# Patient Record
Sex: FEMALE | Race: WHITE | NOT HISPANIC OR LATINO | Employment: OTHER | URBAN - METROPOLITAN AREA
[De-identification: names, ages, dates, MRNs, and addresses within clinical notes are randomized per-mention and may not be internally consistent; named-entity substitution may affect disease eponyms.]

---

## 2019-06-06 ENCOUNTER — APPOINTMENT (OUTPATIENT)
Dept: WOMENS IMAGING | Facility: HOSPITAL | Age: 54
End: 2019-06-06

## 2019-06-06 PROCEDURE — 77067 SCR MAMMO BI INCL CAD: CPT | Performed by: RADIOLOGY

## 2021-06-16 ENCOUNTER — OFFICE VISIT (OUTPATIENT)
Dept: OBSTETRICS AND GYNECOLOGY | Age: 56
End: 2021-06-16

## 2021-06-16 VITALS
BODY MASS INDEX: 33.13 KG/M2 | SYSTOLIC BLOOD PRESSURE: 140 MMHG | HEIGHT: 62 IN | WEIGHT: 180 LBS | DIASTOLIC BLOOD PRESSURE: 86 MMHG

## 2021-06-16 DIAGNOSIS — Z12.31 SCREENING MAMMOGRAM, ENCOUNTER FOR: ICD-10-CM

## 2021-06-16 DIAGNOSIS — Z01.419 WELL WOMAN EXAM WITH ROUTINE GYNECOLOGICAL EXAM: Primary | ICD-10-CM

## 2021-06-16 PROBLEM — K21.9 GASTROESOPHAGEAL REFLUX DISEASE: Status: ACTIVE | Noted: 2021-06-16

## 2021-06-16 PROCEDURE — 99386 PREV VISIT NEW AGE 40-64: CPT | Performed by: NURSE PRACTITIONER

## 2021-06-16 RX ORDER — AMLODIPINE BESYLATE 5 MG/1
5 TABLET ORAL DAILY
COMMUNITY
Start: 2021-04-22 | End: 2022-09-27

## 2021-06-16 RX ORDER — SERTRALINE HYDROCHLORIDE 100 MG/1
100 TABLET, FILM COATED ORAL DAILY
COMMUNITY
Start: 2021-05-03 | End: 2022-06-24

## 2021-06-16 RX ORDER — MELOXICAM 15 MG/1
15 TABLET ORAL DAILY
COMMUNITY
Start: 2021-04-22 | End: 2022-10-12

## 2021-06-16 RX ORDER — LOSARTAN POTASSIUM 100 MG/1
100 TABLET ORAL DAILY
COMMUNITY
Start: 2021-04-22 | End: 2022-10-12

## 2021-06-16 RX ORDER — HYDROXYZINE HYDROCHLORIDE 25 MG/1
25 TABLET, FILM COATED ORAL EVERY 8 HOURS PRN
COMMUNITY
Start: 2021-05-03 | End: 2022-05-31 | Stop reason: SDUPTHER

## 2021-06-16 RX ORDER — METOPROLOL TARTRATE 50 MG/1
TABLET, FILM COATED ORAL
COMMUNITY
Start: 2021-04-22 | End: 2022-04-09

## 2021-06-16 NOTE — PROGRESS NOTES
[facility]  Routine Annual Visit    2021    Patient: Rajani Valentine          MR#:[id number]      History of Present Illness    55 y.o. female No obstetric history on file. who presents for annual exam as a new patient    Reports last GYN care/mammogram about 2 years ago  Former patient of Dr. Hager  Reports hx abnormal pap years ago followed by negatives  Reports yearly mammograms through [facility]- calling for report  She denies any significant GYN history other than 3   Reports menopause at age 50    She states for the past 3 months she has experienced a vaginal irritation that comes and goes right side vaginal wall that occasionally bleeds.  Reports no new partners or concern for STI. In a relationship x 17 years.  She also reports intermittent pelvic pain x 3 months as well.    Patient's last menstrual period was 10/16/2015.  Obstetric History:  OB History    No obstetric history on file.        Menstrual History:     Patient's last menstrual period was 10/16/2015.       Sexual History:       ________________________________________  Patient Active Problem List   Diagnosis   • Gastroesophageal reflux disease       Past Medical History:   Diagnosis Date   • Anxiety    • Depression    • Irritable bowel syndrome        Past Surgical History:   Procedure Laterality Date   • ANKLE ARTHROPLASTY Left    • ANKLE SURGERY     • CHOLECYSTECTOMY     • CHOLECYSTECTOMY Bilateral    • ENDOMETRIAL ABLATION     • TUBAL ABDOMINAL LIGATION         Social History     Tobacco Use   Smoking Status Light Tobacco Smoker   Smokeless Tobacco Never Used   Tobacco Comment    quit 2 years ago       Family History   Problem Relation Age of Onset   • Skin cancer Father    • Heart disease Father    • Hypertension Mother    • Hypertension Brother    • No Known Problems Sister    • No Known Problems Paternal Grandfather    • Heart disease Paternal Grandmother    • Colon cancer Maternal  Grandmother    • Dementia Maternal Grandfather    • No Known Problems Maternal Aunt    • No Known Problems Maternal Uncle    • No Known Problems Paternal Aunt    • Skin cancer Paternal Uncle        Prior to Admission medications    Medication Sig Start Date End Date Taking? Authorizing Provider   amLODIPine (NORVASC) 5 MG tablet  4/22/21  Yes Malena Rea MD   hydrOXYzine (ATARAX) 25 MG tablet  5/3/21  Yes Malena Rea MD   losartan (COZAAR) 100 MG tablet  4/22/21  Yes Malena Rea MD   meloxicam (MOBIC) 15 MG tablet  4/22/21  Yes ProviderMalena MD   metoprolol tartrate (LOPRESSOR) 50 MG tablet  4/22/21  Yes Malena Rea MD   ondansetron ODT (ZOFRAN-ODT) 8 MG disintegrating tablet Take 1 tablet by mouth every 8 (eight) hours as needed for nausea or vomiting. 8/18/16  Yes Monroe Sharma MD   sertraline (ZOLOFT) 100 MG tablet  5/3/21  Yes Malena Rea MD     ________________________________________    The following portions of the patient's history were reviewed and updated as appropriate: allergies, current medications, past family history, past medical history, past social history, past surgical history and problem list.    Review of Systems   Constitutional: Negative.    HENT: Negative.    Eyes: Negative for visual disturbance.   Respiratory: Negative for cough, shortness of breath and wheezing.    Cardiovascular: Negative for chest pain, palpitations and leg swelling.   Gastrointestinal: Negative for abdominal distention, abdominal pain, blood in stool, constipation, diarrhea, nausea and vomiting.   Endocrine: Negative for cold intolerance and heat intolerance.   Genitourinary: Positive for vaginal pain. Negative for difficulty urinating, dyspareunia, dysuria, frequency, genital sores, hematuria, menstrual problem, pelvic pain, urgency, vaginal bleeding and vaginal discharge.   Musculoskeletal: Negative.    Skin: Negative.    Neurological: Negative for  "dizziness, weakness, light-headedness, numbness and headaches.   Hematological: Negative.    Psychiatric/Behavioral: Negative.    Breasts: negative for lumps skin changes, dimpling, swelling, nipple changes/discharge bilaterally       Objective   Physical Exam    /86   Ht 157.5 cm (62\")   Wt 81.6 kg (180 lb)   LMP 10/16/2015   Breastfeeding No   BMI 32.92 kg/m²    BP Readings from Last 3 Encounters:   06/16/21 140/86   08/18/16 147/100      Wt Readings from Last 3 Encounters:   06/16/21 81.6 kg (180 lb)   08/18/16 74.8 kg (165 lb)        BMI: Estimated body mass index is 32.92 kg/m² as calculated from the following:    Height as of this encounter: 157.5 cm (62\").    Weight as of this encounter: 81.6 kg (180 lb).            General:   alert, appears stated age and cooperative   Heart: regular rate and rhythm, S1, S2 normal, no murmur, click, rub or gallop   Lungs: clear to auscultation bilaterally   Abdomen: soft, non-tender, without masses or organomegaly   Breast: inspection negative, no nipple discharge or bleeding, no masses or nodularity palpable   Vulva: External genitalia including bartholin's glands, Urethra, [place]'s gland and urethra meatus are normal, Perineum, rectum and anus appear normal  and Bladder appears normal without significant prolapse    Vagina: normal mucosa, normal discharge   Cervix: no cervical motion tenderness and no lesions   Uterus: normal size, mobile, non-tender and normal shape and consistency   Adnexa: no mass, fullness, tenderness     Assessment:    Diagnoses and all orders for this visit:    1. Well woman exam with routine gynecological exam (Primary)  -     IgP, Aptima HPV    2. Screening mammogram, encounter for  -     Mammo Screening Digital Tomosynthesis Bilateral With CAD; Future        Healthy lifestyle modifications discussed, counseled on self breast exams and bone health    No acute findings on exam today. Return for T/V US and vulvar check Dr. Rodrigo ALEXANDER " SKY Lynn  6/16/2021 13:31 EDT

## 2021-06-18 ENCOUNTER — TELEPHONE (OUTPATIENT)
Dept: OBSTETRICS AND GYNECOLOGY | Age: 56
End: 2021-06-18

## 2021-06-18 NOTE — TELEPHONE ENCOUNTER
PT CALLED STATES SHE IS HAVING THE VAGINAL BURNING AGAIN THAT SHE DISCUSSED WITH YOU ON WED. 6/16/21 WHEN SHE WAS IN THE OFFICE. PT ASKING FOR YOU TO CALL HER BACK AND TO POSSIBLY CALL A MEDICATION INTO HER PHARMACY. ADVISED PT YOU WOULD NOT BE BACK IN OFFICE UNTIL MON. 6/21/21. PT SAID SHE UNDERSTOOD AND WOULD WAIT TO HEAR FROM US THEN. PT PHONE NUMBER [phone]. PLEASE ADVISE.

## 2021-06-21 NOTE — TELEPHONE ENCOUNTER
No abnormalities were seen on exam last week. Recommend keep upcoming appt with Dr. Wallace to further assess.

## 2021-06-22 PROBLEM — B97.7 HPV IN FEMALE: Status: ACTIVE | Noted: 2021-06-22

## 2021-06-22 PROBLEM — R87.622 LGSIL PAP SMEAR OF VAGINA: Status: ACTIVE | Noted: 2021-06-22

## 2021-06-22 LAB
CYTOLOGIST CVX/VAG CYTO: ABNORMAL
CYTOLOGY CVX/VAG DOC CYTO: ABNORMAL
CYTOLOGY CVX/VAG DOC THIN PREP: ABNORMAL
DX ICD CODE: ABNORMAL
DX ICD CODE: ABNORMAL
HIV 1 & 2 AB SER-IMP: ABNORMAL
HPV I/H RISK 4 DNA CVX QL PROBE+SIG AMP: POSITIVE
OTHER STN SPEC: ABNORMAL
PATHOLOGIST CVX/VAG CYTO: ABNORMAL
RECOM F/U CVX/VAG CYTO: ABNORMAL
STAT OF ADQ CVX/VAG CYTO-IMP: ABNORMAL

## 2021-07-14 ENCOUNTER — OFFICE VISIT (OUTPATIENT)
Dept: OBSTETRICS AND GYNECOLOGY | Age: 56
End: 2021-07-14

## 2021-07-14 VITALS
WEIGHT: 176 LBS | HEIGHT: 62 IN | BODY MASS INDEX: 32.39 KG/M2 | SYSTOLIC BLOOD PRESSURE: 124 MMHG | DIASTOLIC BLOOD PRESSURE: 80 MMHG

## 2021-07-14 DIAGNOSIS — B97.7 HPV IN FEMALE: ICD-10-CM

## 2021-07-14 DIAGNOSIS — N90.5 VULVAR ATROPHY: ICD-10-CM

## 2021-07-14 DIAGNOSIS — I10 ESSENTIAL HYPERTENSION: ICD-10-CM

## 2021-07-14 DIAGNOSIS — B37.31 CANDIDAL VULVOVAGINITIS: Primary | ICD-10-CM

## 2021-07-14 DIAGNOSIS — R87.612 LGSIL ON PAP SMEAR OF CERVIX: ICD-10-CM

## 2021-07-14 PROBLEM — R87.622 LGSIL PAP SMEAR OF VAGINA: Status: RESOLVED | Noted: 2021-06-22 | Resolved: 2021-07-14

## 2021-07-14 PROCEDURE — 99214 OFFICE O/P EST MOD 30 MIN: CPT | Performed by: OBSTETRICS & GYNECOLOGY

## 2021-07-14 RX ORDER — CONJUGATED ESTROGENS 0.62 MG/G
CREAM VAGINAL DAILY
Qty: 30 G | Refills: 6 | Status: SHIPPED | OUTPATIENT
Start: 2021-07-14 | End: 2021-08-11

## 2021-07-14 NOTE — PROGRESS NOTES
"    2021      Patient:  Rajani Valentine   MR#:[id number]    Office note    Chief Complaint   Patient presents with   • Gynecologic Exam     cc: vaginal irritation that comes and goes , describes like having a \" sunburn there \",  occasionally bleeds - spoting , itching , pt c/o having diarrhea - due for colonoscopy - family hx of colon cx ,  last menstrual period was 10/16/2015- ablation , tubal for contraception , last pap 2021 lsil hpv pos , pt reports having one abn pap maybe 20 yrs ago        Subjective     History of Present Illness  55 y.o. female  presents with complaint of intermittent moderate chronic vulvar irritation and burning for more than a year.  Seems to be exacerbated by any type of contact and her symptoms appear to be worsening over the past 6 months.    Additionally the patient had LGSIL on Pap smear and is scheduled for colposcopy in September.  We reviewed LGSIL and discussed appropriate therapy and management.        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 cervix       Past Medical History:   Diagnosis Date   • Anxiety    • Depression    • Irritable bowel syndrome      Past Surgical History:   Procedure Laterality Date   • ANKLE ARTHROPLASTY Left    • ANKLE SURGERY     • CHOLECYSTECTOMY     • CHOLECYSTECTOMY Bilateral    • ENDOMETRIAL ABLATION     • TUBAL ABDOMINAL LIGATION       Obstetric History:  OB History        3    Para   3    Term   3            AB        Living   3       SAB        TAB        Ectopic        Molar        Multiple        Live Births   3               Menstrual History:     Patient's last menstrual period was 10/16/2015 (exact date).       # 1 - Date: , Sex: Female, Weight: None, GA: None, Delivery: Vaginal, Spontaneous, Apgar1: None, Apgar5: None, " Living: Living, Birth Comments: None    # 2 - Date: 1989, Sex: Female, Weight: None, GA: None, Delivery: Vaginal, Spontaneous, Apgar1: None, Apgar5: None, Living: Living, Birth Comments: None    # 3 - Date: 1992, Sex: Female, Weight: None, GA: None, Delivery: Vaginal, Spontaneous, Apgar1: None, Apgar5: None, Living: Living, Birth Comments: None    Family History   Problem Relation Age of Onset   • Skin cancer Father    • Heart disease Father    • Hypertension Mother    • Hypertension Brother    • No Known Problems Sister    • No Known Problems Paternal Grandfather    • Heart disease Paternal Grandmother    • Colon cancer Maternal Grandmother    • Dementia Maternal Grandfather    • No Known Problems Maternal Aunt    • No Known Problems Maternal Uncle    • No Known Problems Paternal Aunt    • Skin cancer Paternal Uncle      Social History     Tobacco Use   • Smoking status: Light Tobacco Smoker   • Smokeless tobacco: Never Used   • Tobacco comment: quit 2 years ago   Substance Use Topics   • Alcohol use: Yes     Comment: socially   • Drug use: No     Gatifloxacin    Current Outpatient Medications:   •  amLODIPine (NORVASC) 5 MG tablet, , Disp: , Rfl:   •  hydrOXYzine (ATARAX) 25 MG tablet, , Disp: , Rfl:   •  losartan (COZAAR) 100 MG tablet, , Disp: , Rfl:   •  meloxicam (MOBIC) 15 MG tablet, , Disp: , Rfl:   •  metoprolol tartrate (LOPRESSOR) 50 MG tablet, , Disp: , Rfl:   •  ondansetron ODT (ZOFRAN-ODT) 8 MG disintegrating tablet, Take 1 tablet by mouth every 8 (eight) hours as needed for nausea or vomiting., Disp: 10 tablet, Rfl: 0  •  sertraline (ZOLOFT) 100 MG tablet, , Disp: , Rfl:     The following portions of the patient's history were reviewed and updated as appropriate: allergies, current medications, past family history, past medical history, past social history, past surgical history and problem list.    Review of Systems   Constitutional: Negative.    Respiratory: Negative.    Cardiovascular: Negative.   "  Gastrointestinal: Negative.    Genitourinary: Negative.         Chronic vulvar pain and irritation.   Psychiatric/Behavioral: Negative.        BP Readings from Last 3 Encounters:   07/14/21 124/80   06/16/21 140/86   08/18/16 147/100      Wt Readings from Last 3 Encounters:   07/14/21 79.8 kg (176 lb)   06/16/21 81.6 kg (180 lb)   08/18/16 74.8 kg (165 lb)      BMI: Estimated body mass index is 32.19 kg/m² as calculated from the following:    Height as of this encounter: 157.5 cm (62\").    Weight as of this encounter: 79.8 kg (176 lb). BSA: Estimated body surface area is 1.81 meters squared as calculated from the following:    Height as of this encounter: 157.5 cm (62\").    Weight as of this encounter: 79.8 kg (176 lb).    Objective   Physical Exam    Assessment/Plan     Diagnoses and all orders for this visit:    1. Candidal vulvovaginitis (Primary)    2. Vulvar atrophy  -     conjugated estrogens (Premarin) 0.625 MG/GM vaginal cream; Insert  into the vagina Daily for 28 days. Apply 1/2 to 1 gram at bedtime as instructed  Dispense: 30 g; Refill: 6    3. LGSIL on Pap smear of cervix    4. HPV in female    5. Essential hypertension      Plan:  Genitan Madalyn applied for cutaneous yeast  Discussed sleeping without underpants at night and keeping the area dry and cool  Premarin cream is prescribed  Discussed expectations for improvement  May return for repeat Genitan Violet applications if burning symptoms or not improved      No follow-ups on file.    Dereje Wallace MD   7/14/2021 14:13 EDT  "

## 2021-09-01 ENCOUNTER — OFFICE VISIT (OUTPATIENT)
Dept: OBSTETRICS AND GYNECOLOGY | Age: 56
End: 2021-09-01

## 2021-09-01 VITALS
DIASTOLIC BLOOD PRESSURE: 88 MMHG | SYSTOLIC BLOOD PRESSURE: 140 MMHG | WEIGHT: 169 LBS | BODY MASS INDEX: 31.1 KG/M2 | HEIGHT: 62 IN

## 2021-09-01 DIAGNOSIS — R87.612 LGSIL ON PAP SMEAR OF CERVIX: Primary | ICD-10-CM

## 2021-09-01 DIAGNOSIS — Z13.89 SCREENING FOR BLOOD OR PROTEIN IN URINE: ICD-10-CM

## 2021-09-01 LAB
BILIRUB BLD-MCNC: NEGATIVE MG/DL
CLARITY, POC: CLEAR
COLOR UR: YELLOW
GLUCOSE UR STRIP-MCNC: NEGATIVE MG/DL
KETONES UR QL: NEGATIVE
LEUKOCYTE EST, POC: ABNORMAL
NITRITE UR-MCNC: NEGATIVE MG/ML
PH UR: 5 [PH] (ref 5–8)
PROT UR STRIP-MCNC: NEGATIVE MG/DL
RBC # UR STRIP: ABNORMAL /UL
SP GR UR: 1.03 (ref 1–1.03)
UROBILINOGEN UR QL: NORMAL

## 2021-09-01 PROCEDURE — 57452 EXAM OF CERVIX W/SCOPE: CPT | Performed by: OBSTETRICS & GYNECOLOGY

## 2021-09-01 RX ORDER — TRAZODONE HYDROCHLORIDE 50 MG/1
50 TABLET ORAL NIGHTLY
COMMUNITY
End: 2022-04-09

## 2021-09-01 RX ORDER — HYDROCHLOROTHIAZIDE 12.5 MG/1
12.5 CAPSULE, GELATIN COATED ORAL DAILY
COMMUNITY
End: 2022-06-24

## 2021-09-01 RX ORDER — OMEPRAZOLE 20 MG/1
20 CAPSULE, DELAYED RELEASE ORAL DAILY
COMMUNITY

## 2021-09-01 NOTE — PROGRESS NOTES
9/1/2021    Patient: Rajani Valentine          MR#:[id number]        Colposcopy Procedure Note      Chief Complaint   Patient presents with   • Follow-up     colpo last pap 6/2021 LSIL HPV+       Indications: Pap smear 3 months ago showed: low-grade squamous intraepithelial neoplasia (LGSIL - encompassing HPV,mild dysplasia,PARAG I).   IgP, Aptima HPV (06/16/2021 14:45)        Procedure Details   The risks and benefits of the procedure and Written informed consent obtained.    Speculum placed in vagina and excellent visualization of cervix achieved, cervix swabbed x 3 with acetic acid solution.    Findings:  Cervix: no visible lesions; SCJ visualized 360 degrees without lesions.  Vaginal inspection: vaginal colposcopy not performed.  Vulvar colposcopy: vulvar colposcopy not performed.    Physical Exam    Specimens: PAP      Impression:  Normal exam, no lesions      Complications: none.    The procedure was well tolerated by the patient without problems.    Plan:  Will base further treatment on Pathology findings.          Dereje Wallace MD  9/1/2021  09:59 EDT

## 2021-09-07 LAB
CYTOLOGIST CVX/VAG CYTO: NORMAL
CYTOLOGY CVX/VAG DOC CYTO: NORMAL
CYTOLOGY CVX/VAG DOC THIN PREP: NORMAL
DX ICD CODE: NORMAL
HIV 1 & 2 AB SER-IMP: NORMAL
HPV I/H RISK 4 DNA CVX QL PROBE+SIG AMP: NEGATIVE
OTHER STN SPEC: NORMAL
PATHOLOGIST CVX/VAG CYTO: NORMAL
STAT OF ADQ CVX/VAG CYTO-IMP: NORMAL

## 2021-09-28 ENCOUNTER — APPOINTMENT (OUTPATIENT)
Dept: WOMENS IMAGING | Facility: HOSPITAL | Age: 56
End: 2021-09-28

## 2021-09-28 PROCEDURE — 77067 SCR MAMMO BI INCL CAD: CPT | Performed by: RADIOLOGY

## 2022-04-09 RX ORDER — TRAZODONE HYDROCHLORIDE 50 MG/1
TABLET ORAL
Qty: 90 TABLET | Refills: 1 | Status: SHIPPED | OUTPATIENT
Start: 2022-04-09 | End: 2022-12-01

## 2022-04-09 RX ORDER — METOPROLOL TARTRATE 50 MG/1
TABLET, FILM COATED ORAL
Qty: 180 TABLET | Refills: 1 | Status: SHIPPED | OUTPATIENT
Start: 2022-04-09 | End: 2022-05-31

## 2022-04-18 ENCOUNTER — OFFICE VISIT (OUTPATIENT)
Dept: FAMILY MEDICINE CLINIC | Facility: CLINIC | Age: 57
End: 2022-04-18

## 2022-04-18 VITALS
HEART RATE: 74 BPM | OXYGEN SATURATION: 98 % | HEIGHT: 63 IN | TEMPERATURE: 97.7 F | WEIGHT: 167.1 LBS | SYSTOLIC BLOOD PRESSURE: 160 MMHG | DIASTOLIC BLOOD PRESSURE: 98 MMHG | BODY MASS INDEX: 29.61 KG/M2

## 2022-04-18 DIAGNOSIS — Z00.00 ENCOUNTER FOR WELLNESS EXAMINATION IN ADULT: Primary | ICD-10-CM

## 2022-04-18 DIAGNOSIS — Z12.11 SCREENING FOR COLORECTAL CANCER: ICD-10-CM

## 2022-04-18 DIAGNOSIS — I10 PRIMARY HYPERTENSION: ICD-10-CM

## 2022-04-18 DIAGNOSIS — K59.00 CONSTIPATION, UNSPECIFIED CONSTIPATION TYPE: ICD-10-CM

## 2022-04-18 DIAGNOSIS — Z12.12 SCREENING FOR COLORECTAL CANCER: ICD-10-CM

## 2022-04-18 PROCEDURE — 99396 PREV VISIT EST AGE 40-64: CPT | Performed by: FAMILY MEDICINE

## 2022-04-18 NOTE — ASSESSMENT & PLAN NOTE
Patient just recently restarted her metoprolol.  She will give this another week and if still elevated she will increase her hydrochlorothiazide to 25 mg daily.  Follow-up in 6 weeks with blood pressure log.

## 2022-04-18 NOTE — PROGRESS NOTES
Patient Name: Rajani Valentine  : 1965   MRN: [id number]     Chief Complaint:    Chief Complaint   Patient presents with   • Hypertension     Follow up on blood pressure        History of Present Illness: Rajani Valentine is a 56 y.o. female who is here today for their annual health maintenance and physical. Patient presents for follow-up on chronic medical problems including hypertension, depression and GERD. Patient denies adverse effects of medications, headache, chest pain, shortness of breath and cough. Patient complains of elevated blood pressure recently.  She was out of her metoprolol for a few weeks and recently just restarted about 1 week ago.  She has noticed that her blood pressures been up because she is been hearing her heartbeat in her ears.  Has been checking intermittently at home and reports they are consistent with today's in office blood pressures.  She is up-to-date on pap and mammogram.  She is due for colon screening.             Review of Systems:   Review of Systems   Constitutional: Negative for chills, fatigue and fever.   Respiratory: Negative for cough and shortness of breath.    Cardiovascular: Negative for chest pain and palpitations.   Gastrointestinal: Negative for abdominal pain, constipation, diarrhea, nausea and vomiting.   Musculoskeletal: Negative for back pain and myalgias.   Neurological: Negative for dizziness and headache.   Psychiatric/Behavioral: Negative for depressed mood. The patient is not nervous/anxious.        Past Medical History, Social History, Family History and Care Team were all reviewed with patient and updated as appropriate.     Medications:     Current Outpatient Medications:   •  amLODIPine (NORVASC) 5 MG tablet, , Disp: , Rfl:   •  hydroCHLOROthiazide (MICROZIDE) 12.5 MG capsule, Take 12.5 mg by mouth Daily., Disp: , Rfl:   •  hydrOXYzine (ATARAX) 25 MG tablet, Take 25 mg by mouth Every 8 (Eight) Hours As Needed for Anxiety., Disp: , Rfl:   •   losartan (COZAAR) 100 MG tablet, Take 100 mg by mouth Daily., Disp: , Rfl:   •  meloxicam (MOBIC) 15 MG tablet, Take 15 mg by mouth Daily., Disp: , Rfl:   •  metoprolol tartrate (LOPRESSOR) 50 MG tablet, TAKE ONE TABLET BY MOUTH TWICE A DAY *TAKE WITH MEALS*, Disp: 180 tablet, Rfl: 1  •  omeprazole (priLOSEC) 20 MG capsule, Take 20 mg by mouth Daily., Disp: , Rfl:   •  sertraline (ZOLOFT) 100 MG tablet, Take 100 mg by mouth Daily., Disp: , Rfl:   •  traZODone (DESYREL) 50 MG tablet, TAKE ONE TABLET BY MOUTH EVERY NIGHT AT BEDTIME, Disp: 90 tablet, Rfl: 1    Allergies:   Allergies   Allergen Reactions   • Diethylpropion Hcl Unknown - Low Severity   • Gatifloxacin Unknown - High Severity   • Valacyclovir Unknown - Low Severity         Depression: PHQ-2 Depression Screening  PHQ-2 Total Score: 0   PHQ-9 Total Score: 0     Intimate partner violence: (Screen on initial visit, pregnant women, women with injuries, older adult with injury or evidence of neglect):  • Violence can be a problem in many people's lives, so I now ask every patient about trauma or abuse they may have experienced in a relationship.  • Stress/Safety - Do you feel safe in your relationship?  • Afraid/Abused - Have you ever been in a relationship where you were threatened, hurt, or afraid?  • Friend/Family - Are your friends aware you have been hurt?  • Emergency Plan - Do you have a safe place to go and the resources you need in an emergency?    Osteoporosis:   • Ost menopausal women < 65 with RF (advancing age, previous fracture, glucocorticoid therapy, parental hip fracture, low body weight, current cigarette smoking, excessive alcohol consumption, rheumatoid arthritis, secondary osteoporosis [hypogonadism/premature menopause, malabsorption, chronic liver disease, IBD]).  • All women 65 or older      Physical Exam:  Vital Signs:   Vitals:    04/18/22 1123   BP: 160/98   BP Location: Left arm   Patient Position: Sitting   Cuff Size: Adult   Pulse:  "74   Temp: 97.7 °F (36.5 °C)   TempSrc: Temporal   SpO2: 98%   Weight: 75.8 kg (167 lb 1.6 oz)   Height: 160 cm (63\")     Body mass index is 29.6 kg/m².     Physical Exam  Vitals and nursing note reviewed.   Constitutional:       Appearance: Normal appearance. She is normal weight.   HENT:      Head: Normocephalic and atraumatic.      Right Ear: Tympanic membrane and ear canal normal.      Left Ear: Tympanic membrane and ear canal normal.      Nose: Nose normal.      Mouth/Throat:      Mouth: Mucous membranes are moist.      Pharynx: Oropharynx is clear.   Eyes:      Conjunctiva/sclera: Conjunctivae normal.      Pupils: Pupils are equal, round, and reactive to light.   Cardiovascular:      Rate and Rhythm: Normal rate and regular rhythm.      Heart sounds: Normal heart sounds. No murmur heard.  Pulmonary:      Effort: Pulmonary effort is normal.      Breath sounds: Normal breath sounds. No wheezing, rhonchi or rales.   Abdominal:      General: Bowel sounds are normal.      Palpations: Abdomen is soft.      Tenderness: There is no abdominal tenderness.   Musculoskeletal:         General: Normal range of motion.      Cervical back: Normal range of motion and neck supple.      Right lower leg: No edema.      Left lower leg: No edema.   Lymphadenopathy:      Cervical: No cervical adenopathy.   Skin:     General: Skin is warm.      Findings: No rash.   Neurological:      General: No focal deficit present.      Mental Status: She is alert and oriented to person, place, and time.      Motor: No weakness.   Psychiatric:         Mood and Affect: Mood normal.         Behavior: Behavior normal.         Procedures      Assessment/Plan:   Diagnoses and all orders for this visit:    1. Encounter for wellness examination in adult (Primary)  Assessment & Plan:  Fasting labs today, referral for colon screening sent.    Orders:  -     CBC Auto Differential; Future  -     Comprehensive Metabolic Panel; Future  -     Hemoglobin A1c; " Future  -     Lipid Panel; Future  -     TSH; Future  -     Vitamin B12; Future  -     Vitamin D 25 Hydroxy; Future  -     CBC Auto Differential  -     Comprehensive Metabolic Panel  -     Hemoglobin A1c  -     Lipid Panel  -     TSH  -     Vitamin B12  -     Vitamin D 25 Hydroxy    2. Primary hypertension  Assessment & Plan:  Patient just recently restarted her metoprolol.  She will give this another week and if still elevated she will increase her hydrochlorothiazide to 25 mg daily.  Follow-up in 6 weeks with blood pressure log.      3. Constipation, unspecified constipation type  Assessment & Plan:  Patient will use MiraLAX as needed.  Referral for colonoscopy sent.    Orders:  -     Ambulatory Referral to General Surgery    4. Screening for colorectal cancer  -     Ambulatory Referral to General Surgery         Follow Up:   Return in about 6 weeks (around 5/30/2022) for BP check.    Healthcare Maintenance:   Counseling provided on Discussed injury prevention, diet and exercise, safe sexual practices, and screening for common diseases. Encouraged use of sunscreen and seatbelts. Encouraged SBE, avoidance of tobacco, limiting alcohol, and yearly dental and eye exams.   Rajani Valentine voices understanding and acceptance of this advice and will call back with any further questions or concerns. AVS with preventive healthcare tips printed for patient.     Nisha Vegas, DO  [facility]

## 2022-04-19 LAB
25(OH)D3+25(OH)D2 SERPL-MCNC: 34.3 NG/ML (ref 30–100)
ALBUMIN SERPL-MCNC: 4.7 G/DL (ref 3.8–4.9)
ALBUMIN/GLOB SERPL: 2.4 {RATIO} (ref 1.2–2.2)
ALP SERPL-CCNC: 133 IU/L (ref 44–121)
ALT SERPL-CCNC: 17 IU/L (ref 0–32)
AST SERPL-CCNC: 20 IU/L (ref 0–40)
BASOPHILS # BLD AUTO: 0 X10E3/UL (ref 0–0.2)
BASOPHILS NFR BLD AUTO: 0 %
BILIRUB SERPL-MCNC: 0.5 MG/DL (ref 0–1.2)
BUN SERPL-MCNC: 16 MG/DL (ref 6–24)
BUN/CREAT SERPL: 18 (ref 9–23)
CALCIUM SERPL-MCNC: 9.4 MG/DL (ref 8.7–10.2)
CHLORIDE SERPL-SCNC: 107 MMOL/L (ref 96–106)
CHOLEST SERPL-MCNC: 199 MG/DL (ref 100–199)
CO2 SERPL-SCNC: 24 MMOL/L (ref 20–29)
CREAT SERPL-MCNC: 0.9 MG/DL (ref 0.57–1)
EGFRCR SERPLBLD CKD-EPI 2021: 75 ML/MIN/1.73
EOSINOPHIL # BLD AUTO: 0.1 X10E3/UL (ref 0–0.4)
EOSINOPHIL NFR BLD AUTO: 1 %
ERYTHROCYTE [DISTWIDTH] IN BLOOD BY AUTOMATED COUNT: 14.2 % (ref 11.7–15.4)
GLOBULIN SER CALC-MCNC: 2 G/DL (ref 1.5–4.5)
GLUCOSE SERPL-MCNC: 105 MG/DL (ref 65–99)
HBA1C MFR BLD: 5.7 % (ref 4.8–5.6)
HCT VFR BLD AUTO: 37.4 % (ref 34–46.6)
HDLC SERPL-MCNC: 48 MG/DL
HGB BLD-MCNC: 12.3 G/DL (ref 11.1–15.9)
IMM GRANULOCYTES # BLD AUTO: 0 X10E3/UL (ref 0–0.1)
IMM GRANULOCYTES NFR BLD AUTO: 0 %
LDLC SERPL CALC-MCNC: 134 MG/DL (ref 0–99)
LYMPHOCYTES # BLD AUTO: 1.5 X10E3/UL (ref 0.7–3.1)
LYMPHOCYTES NFR BLD AUTO: 30 %
MCH RBC QN AUTO: 29.1 PG (ref 26.6–33)
MCHC RBC AUTO-ENTMCNC: 32.9 G/DL (ref 31.5–35.7)
MCV RBC AUTO: 89 FL (ref 79–97)
MONOCYTES # BLD AUTO: 0.5 X10E3/UL (ref 0.1–0.9)
MONOCYTES NFR BLD AUTO: 9 %
NEUTROPHILS # BLD AUTO: 2.8 X10E3/UL (ref 1.4–7)
NEUTROPHILS NFR BLD AUTO: 60 %
PLATELET # BLD AUTO: 233 X10E3/UL (ref 150–450)
POTASSIUM SERPL-SCNC: 4 MMOL/L (ref 3.5–5.2)
PROT SERPL-MCNC: 6.7 G/DL (ref 6–8.5)
RBC # BLD AUTO: 4.22 X10E6/UL (ref 3.77–5.28)
SODIUM SERPL-SCNC: 143 MMOL/L (ref 134–144)
TRIGL SERPL-MCNC: 93 MG/DL (ref 0–149)
TSH SERPL DL<=0.005 MIU/L-ACNC: 3.44 UIU/ML (ref 0.45–4.5)
VIT B12 SERPL-MCNC: 508 PG/ML (ref 232–1245)
VLDLC SERPL CALC-MCNC: 17 MG/DL (ref 5–40)
WBC # BLD AUTO: 4.8 X10E3/UL (ref 3.4–10.8)

## 2022-05-17 ENCOUNTER — TELEPHONE (OUTPATIENT)
Dept: FAMILY MEDICINE CLINIC | Facility: CLINIC | Age: 57
End: 2022-05-17

## 2022-05-17 ENCOUNTER — NURSE TRIAGE (OUTPATIENT)
Dept: CALL CENTER | Facility: HOSPITAL | Age: 57
End: 2022-05-17

## 2022-05-17 NOTE — TELEPHONE ENCOUNTER
"  Connected her to office  Reason for Disposition  • Requesting regular office appointment    Additional Information  • Negative: [1] Caller is not with the adult (patient) AND [2] reporting urgent symptoms  • Negative: Lab result questions  • Negative: Medication questions  • Negative: Caller can't be reached by phone  • Negative: Caller has already spoken to PCP or another triager  • Negative: RN needs further essential information from caller in order to complete triage  • Negative: [1] Caller requesting NON-URGENT health information AND [2] PCP's office is the best resource  • Negative: Health Information question, no triage required and triager able to answer question  • Negative: General information question, no triage required and triager able to answer question    Answer Assessment - Initial Assessment Questions  1. REASON FOR CALL or QUESTION: \"What is your reason for calling today?\" or \"How can I best help you?\" or \"What question do you have that I can help answer?\"      Needing to get hold of  office    Protocols used: INFORMATION ONLY CALL - NO TRIAGE-ADULT-AH      "

## 2022-05-17 NOTE — TELEPHONE ENCOUNTER
Patient called, looking to discuss lab results. Patient is not able to get into MyChart. Please advise.

## 2022-05-31 ENCOUNTER — OFFICE VISIT (OUTPATIENT)
Dept: FAMILY MEDICINE CLINIC | Facility: CLINIC | Age: 57
End: 2022-05-31

## 2022-05-31 VITALS
BODY MASS INDEX: 28.7 KG/M2 | DIASTOLIC BLOOD PRESSURE: 90 MMHG | HEART RATE: 95 BPM | HEIGHT: 63 IN | SYSTOLIC BLOOD PRESSURE: 138 MMHG | OXYGEN SATURATION: 98 % | WEIGHT: 162 LBS

## 2022-05-31 DIAGNOSIS — I10 PRIMARY HYPERTENSION: Primary | ICD-10-CM

## 2022-05-31 DIAGNOSIS — R00.0 TACHYCARDIA: ICD-10-CM

## 2022-05-31 DIAGNOSIS — F41.1 GAD (GENERALIZED ANXIETY DISORDER): ICD-10-CM

## 2022-05-31 DIAGNOSIS — H61.21 IMPACTED CERUMEN OF RIGHT EAR: ICD-10-CM

## 2022-05-31 PROCEDURE — 69210 REMOVE IMPACTED EAR WAX UNI: CPT | Performed by: FAMILY MEDICINE

## 2022-05-31 PROCEDURE — 99214 OFFICE O/P EST MOD 30 MIN: CPT | Performed by: FAMILY MEDICINE

## 2022-05-31 RX ORDER — METOPROLOL SUCCINATE 200 MG/1
200 TABLET, EXTENDED RELEASE ORAL DAILY
Qty: 90 TABLET | Refills: 1 | Status: SHIPPED | OUTPATIENT
Start: 2022-05-31

## 2022-05-31 RX ORDER — BUSPIRONE HYDROCHLORIDE 7.5 MG/1
7.5 TABLET ORAL 2 TIMES DAILY
Qty: 60 TABLET | Refills: 2 | Status: SHIPPED | OUTPATIENT
Start: 2022-05-31 | End: 2023-01-19

## 2022-05-31 RX ORDER — HYDROXYZINE HYDROCHLORIDE 25 MG/1
25 TABLET, FILM COATED ORAL EVERY 8 HOURS PRN
Qty: 60 TABLET | Refills: 2 | Status: SHIPPED | OUTPATIENT
Start: 2022-05-31
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
## 2022-05-31 NOTE — ASSESSMENT & PLAN NOTE
Will change Lopressor to Toprol-XL and increase to 200 mg daily.  Follow-up in 1 month for blood pressure check.  Side effects discussed

## 2022-06-24 ENCOUNTER — TELEMEDICINE (OUTPATIENT)
Dept: FAMILY MEDICINE CLINIC | Facility: CLINIC | Age: 57
End: 2022-06-24

## 2022-06-24 VITALS
WEIGHT: 163 LBS | BODY MASS INDEX: 28.88 KG/M2 | DIASTOLIC BLOOD PRESSURE: 90 MMHG | HEIGHT: 63 IN | SYSTOLIC BLOOD PRESSURE: 150 MMHG

## 2022-06-24 DIAGNOSIS — F41.1 GAD (GENERALIZED ANXIETY DISORDER): ICD-10-CM

## 2022-06-24 DIAGNOSIS — I10 PRIMARY HYPERTENSION: Primary | ICD-10-CM

## 2022-06-24 PROCEDURE — 99213 OFFICE O/P EST LOW 20 MIN: CPT | Performed by: FAMILY MEDICINE

## 2022-06-24 RX ORDER — HYDROCHLOROTHIAZIDE 25 MG/1
25 TABLET ORAL DAILY
Qty: 90 TABLET | Refills: 1 | Status: SHIPPED | OUTPATIENT
Start: 2022-06-24 | End: 2022-10-21

## 2022-06-24 RX ORDER — SERTRALINE HYDROCHLORIDE 100 MG/1
100 TABLET, FILM COATED ORAL DAILY
COMMUNITY
End: 2022-09-27 | Stop reason: SDUPTHER

## 2022-06-24 NOTE — ASSESSMENT & PLAN NOTE
Unclear if elevated blood pressure is what is causing her symptoms however patient was instructed if symptoms are persistent after increasing hydrochlorothiazide to 25 mg daily that she is to make an appointment to be seen in the office

## 2022-06-24 NOTE — PROGRESS NOTES
Patient was seen today through synchronous audio/video technology. Verbal consent was obtained. The patient was located at home. Vitals signs were not obtained due to lack of home monitoring access.       Date: 2022   Patient Name: Rajani Valentine  : 1965   MRN: [id number]     Chief Complaint:    Chief Complaint   Patient presents with   • Hypertension     Follow up blood pressure, vision changes, lightheaded        History of Present Illness: Rajani Valentine is a 56 y.o. female who is here today for Follow-up for anxiety and hypertension.  She reports significant improvement after addition of BuSpar which she is just taking at bedtime.  She denies side effects to the medication.    She does report a weird feeling in her head that is not quite dizziness or lightheadedness but she describes as feeling unsteady and that her vision is different than normal.  She reports blood pressure has been as high as 160/100 when she was at her rheumatology appointment.  She denies chest pain and is taking all medications as prescribed.           Review of Systems:   Review of Systems   Constitutional: Negative for chills, fatigue and fever.   Eyes: Positive for visual disturbance.   Respiratory: Negative for cough and shortness of breath.    Cardiovascular: Negative for chest pain and palpitations.   Gastrointestinal: Negative for abdominal pain, constipation, diarrhea, nausea and vomiting.   Musculoskeletal: Negative for back pain and myalgias.   Neurological: Positive for dizziness. Negative for headache.   Psychiatric/Behavioral: Negative for depressed mood. The patient is not nervous/anxious.        Past Medical History:   Past Medical History:   Diagnosis Date   • Anxiety    • Depression    • Esophageal reflux    • Hypertension    • Irritable bowel syndrome    • Pregnancy     3       Past Surgical History:   Past Surgical History:   Procedure Laterality Date   • ANKLE ARTHROPLASTY Left    • ANKLE  SURGERY     • CHOLECYSTECTOMY     • CHOLECYSTECTOMY Bilateral    • COLPOSCOPY W/ BIOPSY / CURETTAGE     • ENDOMETRIAL ABLATION     • TUBAL ABDOMINAL LIGATION         Family History:   Family History   Problem Relation Age of Onset   • Hypertension Mother    • Alzheimer's disease Mother    • Allergies Mother    • Skin cancer Father    • Heart disease Father    • Allergies Sister    • Hypertension Brother    • No Known Problems Maternal Aunt    • No Known Problems Maternal Uncle    • No Known Problems Paternal Aunt    • Skin cancer Paternal Uncle    • Colon cancer Maternal Grandmother    • Dementia Maternal Grandfather    • Heart disease Paternal Grandmother    • No Known Problems Paternal Grandfather    • Alzheimer's disease Other        Social History:   Social History     Socioeconomic History   • Marital status:    • Number of children: 3   Tobacco Use   • Smoking status: Former Smoker     Packs/day: 0.50     Years: 37.00     Pack years: 18.50     Types: Cigarettes     Quit date:      Years since quittin.4   • Smokeless tobacco: Never Used   • Tobacco comment: quit 2 years ago   Vaping Use   • Vaping Use: Never used   Substance and Sexual Activity   • Alcohol use: Yes     Comment: socially   • Drug use: No   • Sexual activity: Yes     Partners: Male     Birth control/protection: Surgical     Comment: BTL        Medications:     Current Outpatient Medications:   •  amLODIPine (NORVASC) 5 MG tablet, Take 5 mg by mouth Daily., Disp: , Rfl:   •  busPIRone (BUSPAR) 7.5 MG tablet, Take 1 tablet by mouth 2 (Two) Times a Day., Disp: 60 tablet, Rfl: 2  •  hydrOXYzine (ATARAX) 25 MG tablet, Take 1 tablet by mouth Every 8 (Eight) Hours As Needed for Anxiety., Disp: 60 tablet, Rfl: 2  •  losartan (COZAAR) 100 MG tablet, Take 100 mg by mouth Daily., Disp: , Rfl:   •  meloxicam (MOBIC) 15 MG tablet, Take 15 mg by mouth Daily., Disp: , Rfl:   •  metoprolol succinate XL (Toprol XL) 200 MG 24 hr tablet, Take 1  "tablet by mouth Daily., Disp: 90 tablet, Rfl: 1  •  omeprazole (priLOSEC) 20 MG capsule, Take 20 mg by mouth Daily., Disp: , Rfl:   •  sertraline (ZOLOFT) 100 MG tablet, Take 100 mg by mouth Daily., Disp: , Rfl:   •  traZODone (DESYREL) 50 MG tablet, TAKE ONE TABLET BY MOUTH EVERY NIGHT AT BEDTIME, Disp: 90 tablet, Rfl: 1  •  hydroCHLOROthiazide (HYDRODIURIL) 25 MG tablet, Take 1 tablet by mouth Daily., Disp: 90 tablet, Rfl: 1    Allergies:   Allergies   Allergen Reactions   • Diethylpropion Hcl Unknown - Low Severity   • Gatifloxacin Unknown - High Severity   • Valacyclovir Unknown - Low Severity         Physical Exam:  Vital Signs:   Vitals:    06/24/22 0905   BP: 150/90   Weight: 73.9 kg (163 lb)   Height: 160 cm (63\")     Body mass index is 28.87 kg/m².     Physical Exam  Vitals and nursing note reviewed.   Neurological:      Mental Status: She is alert and oriented to person, place, and time.   Psychiatric:         Mood and Affect: Mood normal.           Assessment/Plan:   Diagnoses and all orders for this visit:    1. Primary hypertension (Primary)  Assessment & Plan:  Unclear if elevated blood pressure is what is causing her symptoms however patient was instructed if symptoms are persistent after increasing hydrochlorothiazide to 25 mg daily that she is to make an appointment to be seen in the office    Orders:  -     hydroCHLOROthiazide (HYDRODIURIL) 25 MG tablet; Take 1 tablet by mouth Daily.  Dispense: 90 tablet; Refill: 1    2. DANETTE (generalized anxiety disorder)  Assessment & Plan:  Psychological condition is improving with treatment.  Continue current treatment regimen.  Psychological condition  will be reassessed in 3 months.           Follow Up:   Return if symptoms worsen or fail to improve.    Nisha Vegas, DO  [facility]    "

## 2022-07-22 ENCOUNTER — OFFICE VISIT (OUTPATIENT)
Dept: OBSTETRICS AND GYNECOLOGY | Age: 57
End: 2022-07-22

## 2022-07-22 VITALS
HEIGHT: 63 IN | DIASTOLIC BLOOD PRESSURE: 82 MMHG | SYSTOLIC BLOOD PRESSURE: 126 MMHG | WEIGHT: 165.6 LBS | BODY MASS INDEX: 29.34 KG/M2

## 2022-07-22 DIAGNOSIS — R14.0 BLOATING: ICD-10-CM

## 2022-07-22 DIAGNOSIS — N89.8 VAGINAL IRRITATION: Primary | ICD-10-CM

## 2022-07-22 PROCEDURE — 99213 OFFICE O/P EST LOW 20 MIN: CPT | Performed by: NURSE PRACTITIONER

## 2022-07-22 NOTE — PROGRESS NOTES
Subjective   Rajani Valentine is a 56 y.o. female.     History of Present Illness     Rajani presents with c/o vaginal irritation and burning x 1 week.   She reports in the past she has been treated with genitan violent and premarin both which caused further irritation she reports.   She uses coconut oil regularly which seems to help her symptoms.     She also c/o pelvic heaviness and bloating x several weeks. No bleeding. No fevers.     The following portions of the patient's history were reviewed and updated as appropriate: allergies, current medications, past family history, past medical history, past social history, past surgical history and problem list.    Review of Systems   Constitutional: Negative for chills, fatigue and fever.   Genitourinary: Positive for pelvic pressure. Negative for decreased urine volume, difficulty urinating, dyspareunia, dysuria, frequency, genital sores, hematuria, menstrual problem, pelvic pain, urgency, urinary incontinence, vaginal bleeding, vaginal discharge and vaginal pain.       Objective   Physical Exam  Constitutional:       Appearance: Normal appearance.   Genitourinary:     Labia:         Right: No rash, tenderness, lesion or injury.         Left: No rash, tenderness, lesion or injury.       Vagina: Normal.   Skin:     General: Skin is warm and dry.   Neurological:      General: No focal deficit present.      Mental Status: She is alert and oriented to person, place, and time.   Psychiatric:         Mood and Affect: Mood normal.         Behavior: Behavior normal.           Assessment & Plan   Diagnoses and all orders for this visit:    1. Vaginal irritation (Primary)    2. Bloating      Recommend desitin cream x 1 week then recheck. Schedule US same day to evaluate uterus and ovaries. Rajani agrees with plan.      SKY Ledezma

## 2022-07-27 ENCOUNTER — OFFICE VISIT (OUTPATIENT)
Dept: OBSTETRICS AND GYNECOLOGY | Age: 57
End: 2022-07-27

## 2022-07-27 VITALS
DIASTOLIC BLOOD PRESSURE: 82 MMHG | WEIGHT: 165 LBS | SYSTOLIC BLOOD PRESSURE: 128 MMHG | HEIGHT: 63 IN | BODY MASS INDEX: 29.23 KG/M2

## 2022-07-27 DIAGNOSIS — R14.0 BLOATING: Primary | ICD-10-CM

## 2022-07-27 PROCEDURE — 99213 OFFICE O/P EST LOW 20 MIN: CPT | Performed by: NURSE PRACTITIONER

## 2022-07-27 NOTE — PROGRESS NOTES
"Subjective   Rajani Valentine is a 56 y.o. female.     History of Present Illness     Rajani presents for follow up US. She has been having some intermittent bloating for several weeks- seems to be resolving.   US obtained and unremarkable. Reviewed with patient.  States could be GI related- she has had GI issues in the past. Normal colonoscopy 2 months ago she reports    Rajani states the desitin cream resolved her vulvar complaint.    The following portions of the patient's history were reviewed and updated as appropriate: allergies, current medications, past family history, past medical history, past social history, past surgical history and problem list.    Review of Systems    See HPI    Objective   Physical Exam  Constitutional:       Appearance: Normal appearance. She is not ill-appearing.   Skin:     General: Skin is warm and dry.   Neurological:      General: No focal deficit present.      Mental Status: She is alert and oriented to person, place, and time.   Psychiatric:         Mood and Affect: Mood normal.         Behavior: Behavior normal.         /82   Ht 160 cm (63\")   Wt 74.8 kg (165 lb)   LMP 10/16/2015 (Exact Date)   Breastfeeding No   BMI 29.23 kg/m²         Assessment & Plan   Diagnoses and all orders for this visit:    1. Bloating (Primary)      Return if symptoms persist for further evaluation. Recommend discussed with PCP.    SKY Ledezma           "

## 2022-09-14 ENCOUNTER — OFFICE VISIT (OUTPATIENT)
Dept: OBSTETRICS AND GYNECOLOGY | Age: 57
End: 2022-09-14

## 2022-09-14 VITALS
BODY MASS INDEX: 29.52 KG/M2 | WEIGHT: 166.6 LBS | SYSTOLIC BLOOD PRESSURE: 130 MMHG | DIASTOLIC BLOOD PRESSURE: 74 MMHG | HEIGHT: 63 IN

## 2022-09-14 DIAGNOSIS — Z11.51 SPECIAL SCREENING EXAMINATION FOR HUMAN PAPILLOMAVIRUS (HPV): ICD-10-CM

## 2022-09-14 DIAGNOSIS — B37.31 VULVOVAGINITIS DUE TO YEAST: ICD-10-CM

## 2022-09-14 DIAGNOSIS — Z12.4 SCREENING FOR MALIGNANT NEOPLASM OF THE CERVIX: ICD-10-CM

## 2022-09-14 DIAGNOSIS — Z13.89 SCREENING FOR BLOOD OR PROTEIN IN URINE: ICD-10-CM

## 2022-09-14 DIAGNOSIS — Z01.419 WELL WOMAN EXAM WITH ROUTINE GYNECOLOGICAL EXAM: Primary | ICD-10-CM

## 2022-09-14 DIAGNOSIS — Z12.31 SCREENING MAMMOGRAM, ENCOUNTER FOR: ICD-10-CM

## 2022-09-14 PROBLEM — Z00.00 ENCOUNTER FOR WELLNESS EXAMINATION IN ADULT: Status: RESOLVED | Noted: 2022-04-18 | Resolved: 2022-09-14

## 2022-09-14 PROBLEM — Z12.11 SCREENING FOR COLORECTAL CANCER: Status: RESOLVED | Noted: 2022-04-18 | Resolved: 2022-09-14

## 2022-09-14 PROBLEM — Z12.12 SCREENING FOR COLORECTAL CANCER: Status: RESOLVED | Noted: 2022-04-18 | Resolved: 2022-09-14

## 2022-09-14 LAB
BILIRUB BLD-MCNC: NEGATIVE MG/DL
CLARITY, POC: CLEAR
COLOR UR: YELLOW
GLUCOSE UR STRIP-MCNC: NEGATIVE MG/DL
KETONES UR QL: NEGATIVE
LEUKOCYTE EST, POC: ABNORMAL
NITRITE UR-MCNC: NEGATIVE MG/ML
PH UR: 6 [PH] (ref 5–8)
PROT UR STRIP-MCNC: NEGATIVE MG/DL
RBC # UR STRIP: NEGATIVE /UL
SP GR UR: 1.01 (ref 1–1.03)
UROBILINOGEN UR QL: NORMAL

## 2022-09-14 PROCEDURE — 99396 PREV VISIT EST AGE 40-64: CPT | Performed by: OBSTETRICS & GYNECOLOGY

## 2022-09-14 PROCEDURE — 81002 URINALYSIS NONAUTO W/O SCOPE: CPT | Performed by: OBSTETRICS & GYNECOLOGY

## 2022-09-14 NOTE — PROGRESS NOTES
[facility]   Obstetrics and Gynecology     2022    Patient: Rajani Valentine          MR#:[id number]    History of Present Illness    Chief Complaint   Patient presents with   • Gynecologic Exam     last pap 2021 neg, last mg 2021       56 y.o. female  who presents for annual exam.    The patient presents for her regular annual exam reporting continued periodic itching in the posterior vaginal fourchette near the perineal body    Studies reviewed:    Patient's last menstrual period was 10/16/2015 (exact date).  Obstetric History:  OB History        3    Para   3    Term   3            AB        Living   3       SAB        IAB        Ectopic        Molar        Multiple        Live Births   3               Menstrual History:     Patient's last menstrual period was 10/16/2015 (exact date).       Sexual History:       Social History     Substance and Sexual Activity   Sexual Activity Yes   • Partners: Male   • Birth control/protection: Surgical    Comment: BTL      ______________________________________  Patient Active Problem List   Diagnosis   • Gastroesophageal reflux disease   • HPV in female   • Primary hypertension   • LGSIL on Pap smear of cervix   • Constipation   • Tachycardia   • DANETTE (generalized anxiety disorder)   • Impacted cerumen of right ear     Past Medical History:   Diagnosis Date   • Anxiety    • Depression    • Esophageal reflux    • Hypertension    • Irritable bowel syndrome    • Pregnancy     3     Past Surgical History:   Procedure Laterality Date   • ANKLE ARTHROPLASTY Left    • ANKLE SURGERY     • CHOLECYSTECTOMY     • CHOLECYSTECTOMY Bilateral    • COLPOSCOPY W/ BIOPSY / CURETTAGE      [facility]   • ENDOMETRIAL ABLATION     • TUBAL ABDOMINAL LIGATION       Social History     Tobacco Use   Smoking Status Former Smoker   • Packs/day: 0.50   • Years: 37.00   • Pack years: 18.50   • Types: Cigarettes   • Quit date:     • Years since quittin.7   Smokeless Tobacco Never Used   Tobacco Comment    quit 2 years ago     Family History   Problem Relation Age of Onset   • Hypertension Mother    • Alzheimer's disease Mother    • Allergies Mother    • Skin cancer Father    • Heart disease Father    • Allergies Sister    • Hypertension Brother    • No Known Problems Maternal Aunt    • No Known Problems Maternal Uncle    • No Known Problems Paternal Aunt    • Skin cancer Paternal Uncle    • Colon cancer Maternal Grandmother    • Dementia Maternal Grandfather    • Heart disease Paternal Grandmother    • No Known Problems Paternal Grandfather    • Alzheimer's disease Other      Prior to Admission medications    Medication Sig Start Date End Date Taking? Authorizing Provider   amLODIPine (NORVASC) 5 MG tablet Take 5 mg by mouth Daily. 21  Yes ProviderMalena MD   busPIRone (BUSPAR) 7.5 MG tablet Take 1 tablet by mouth 2 (Two) Times a Day. 22  Yes Nisha Vegas DO   hydroCHLOROthiazide (HYDRODIURIL) 25 MG tablet Take 1 tablet by mouth Daily. 22  Yes Nisha Vegas DO   hydrOXYzine (ATARAX) 25 MG tablet Take 1 tablet by mouth Every 8 (Eight) Hours As Needed for Anxiety. 22  Yes Nisha Vegas DO   losartan (COZAAR) 100 MG tablet Take 100 mg by mouth Daily. 21  Yes ProviderMalena MD   meloxicam (MOBIC) 15 MG tablet Take 15 mg by mouth Daily. 21  Yes ProviderMalena MD   metoprolol succinate XL (Toprol XL) 200 MG 24 hr tablet Take 1 tablet by mouth Daily. 22  Yes Nisha Vegas DO   omeprazole (priLOSEC) 20 MG capsule Take 20 mg by mouth Daily.   Yes Provider, MD Malena   sertraline (ZOLOFT) 100 MG tablet Take 100 mg by mouth Daily.   Yes Provider, MD Malena   traZODone (DESYREL) 50 MG tablet TAKE ONE TABLET BY MOUTH EVERY NIGHT AT BEDTIME 22  Yes Nisha Vegas DO  "    _______________________________________    Current contraception: post menopausal status  History of abnormal Pap smear: no  Family history of uterine or ovarian cancer: no  Family History of colon cancer/colon polyps: no  History of abnormal mammogram: no  History of abnormal lipids: no    The following portions of the patient's history were reviewed and updated as appropriate: allergies, current medications, past family history, past medical history, past social history, past surgical history and problem list.    Review of Systems    Pertinent items are noted in HPI.       Objective   Physical Exam  Genitourinary:            /74   Ht 160 cm (63\")   Wt 75.6 kg (166 lb 9.6 oz)   LMP 10/16/2015 (Exact Date)   Breastfeeding No   BMI 29.51 kg/m²    BP Readings from Last 3 Encounters:   09/14/22 130/74   07/27/22 128/82   07/22/22 126/82      Wt Readings from Last 3 Encounters:   09/14/22 75.6 kg (166 lb 9.6 oz)   07/27/22 74.8 kg (165 lb)   07/22/22 75.1 kg (165 lb 9.6 oz)        BMI: Estimated body mass index is 29.51 kg/m² as calculated from the following:    Height as of this encounter: 160 cm (63\").    Weight as of this encounter: 75.6 kg (166 lb 9.6 oz).       General: alert, appears stated age and cooperative   Heart: regular rate and rhythm, S1, S2 normal, no murmur, click, rub or gallop   Lungs: clear to auscultation bilaterally   Abdomen: soft, non-tender, without masses, no organomegaly   Breast: inspection negative, no nipple discharge or bleeding, no masses or nodularity palpable   External genitalia/Vulva: External genitalia including bartholin's glands, Urethra, [place]'s gland and urethra meatus are normal, Perineum, rectum and anus appear normal  and Bladder appears normal without significant prolapse    Vagina: normal mucosa, normal discharge   Cervix: no lesions   Uterus: normal size, mobile and non-tender   Adnexa: normal adnexa     As part of wellness and prevention, the following " topics were discussed with the patient:  Encouraged self breast exam  Physical activity and regular exercised encouraged.             Assessment:  Diagnoses and all orders for this visit:    1. Well woman exam with routine gynecological exam (Primary)  -     IgP, Aptima HPV    2. Screening for blood or protein in urine  -     POC Urinalysis Dipstick    3. Screening for malignant neoplasm of the cervix  -     IgP, Aptima HPV    4. Special screening examination for human papillomavirus (HPV)  -     IgP, Aptima HPV    5. Screening mammogram, encounter for  -     Mammo Screening Digital Tomosynthesis Bilateral With CAD; Future    6. Vulvovaginitis due to yeast  -     nystatin-triamcinolone (MYCOLOG II) 903369-1.1 UNIT/GM-% cream; Apply 1 application topically to the appropriate area as directed 2 (Two) Times a Day for 5 days.  Dispense: 30 g; Refill: 3      Plan:  Return in about 1 year (around 9/14/2023) for Annual GYN exam.    Dereje Wallace MD  9/14/2022 10:58 EDT

## 2022-09-19 LAB
CYTOLOGIST CVX/VAG CYTO: NORMAL
CYTOLOGY CVX/VAG DOC CYTO: NORMAL
CYTOLOGY CVX/VAG DOC THIN PREP: NORMAL
DX ICD CODE: NORMAL
HIV 1 & 2 AB SER-IMP: NORMAL
HPV I/H RISK 4 DNA CVX QL PROBE+SIG AMP: NEGATIVE
OTHER STN SPEC: NORMAL
STAT OF ADQ CVX/VAG CYTO-IMP: NORMAL

## 2022-09-27 ENCOUNTER — OFFICE VISIT (OUTPATIENT)
Dept: FAMILY MEDICINE CLINIC | Facility: CLINIC | Age: 57
End: 2022-09-27

## 2022-09-27 VITALS
HEART RATE: 89 BPM | OXYGEN SATURATION: 99 % | BODY MASS INDEX: 28.52 KG/M2 | WEIGHT: 161 LBS | SYSTOLIC BLOOD PRESSURE: 162 MMHG | DIASTOLIC BLOOD PRESSURE: 104 MMHG | TEMPERATURE: 96.9 F

## 2022-09-27 DIAGNOSIS — I10 PRIMARY HYPERTENSION: ICD-10-CM

## 2022-09-27 DIAGNOSIS — F33.1 MODERATE EPISODE OF RECURRENT MAJOR DEPRESSIVE DISORDER: ICD-10-CM

## 2022-09-27 DIAGNOSIS — G51.0 BELL'S PALSY: Primary | ICD-10-CM

## 2022-09-27 PROCEDURE — 99214 OFFICE O/P EST MOD 30 MIN: CPT | Performed by: FAMILY MEDICINE

## 2022-09-27 RX ORDER — CIPROFLOXACIN 500 MG/1
TABLET, FILM COATED ORAL
COMMUNITY
Start: 2022-09-23 | End: 2022-09-27

## 2022-09-27 RX ORDER — PREDNISONE 20 MG/1
TABLET ORAL
COMMUNITY
Start: 2022-09-23

## 2022-09-27 RX ORDER — AMLODIPINE BESYLATE 10 MG/1
10 TABLET ORAL DAILY
Qty: 90 TABLET | Refills: 1 | Status: SHIPPED | OUTPATIENT
Start: 2022-09-27

## 2022-09-27 RX ORDER — SERTRALINE HYDROCHLORIDE 100 MG/1
150 TABLET, FILM COATED ORAL DAILY
Qty: 135 TABLET | Refills: 1 | Status: SHIPPED | OUTPATIENT
Start: 2022-09-27

## 2022-09-27 RX ORDER — CLONIDINE HYDROCHLORIDE 0.1 MG/1
0.1 TABLET ORAL 2 TIMES DAILY PRN
Qty: 30 TABLET | Refills: 5 | Status: SHIPPED | OUTPATIENT
Start: 2022-09-27
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
## 2022-09-28 PROBLEM — F33.1 MODERATE EPISODE OF RECURRENT MAJOR DEPRESSIVE DISORDER: Status: ACTIVE | Noted: 2022-09-28

## 2022-09-28 NOTE — ASSESSMENT & PLAN NOTE
Patient's depression is recurrent and is moderate without psychosis. Their depression is currently active and the condition is worsening. This will be reassessed in 4 weeks. F/U as described:Increase Zoloft to 150 mg daily. patient given information for counseling services.

## 2022-09-28 NOTE — ASSESSMENT & PLAN NOTE
Hypertension is worsening.  Ambulatory blood pressure monitoring.  Increase amlodipine to 10 mg daily and add clonidine point 1 mg 3 times daily as needed for blood pressure greater than 160/100  Blood pressure will be reassessed in 4 weeks.

## 2022-09-28 NOTE — ASSESSMENT & PLAN NOTE
Patient was made aware that Bell's palsy is not dangerous and there is a chance that function will not return.  She has been treated appropriately, will continue to monitor symptoms

## 2022-10-12 RX ORDER — MELOXICAM 15 MG/1
TABLET ORAL
Qty: 90 TABLET | Refills: 1 | Status: SHIPPED | OUTPATIENT
Start: 2022-10-12

## 2022-10-12 RX ORDER — LOSARTAN POTASSIUM 100 MG/1
TABLET ORAL
Qty: 90 TABLET | Refills: 1 | Status: SHIPPED | OUTPATIENT
Start: 2022-10-12

## 2022-10-20 RX ORDER — METOPROLOL TARTRATE 50 MG/1
TABLET, FILM COATED ORAL
OUTPATIENT
Start: 2022-10-20

## 2022-10-21 ENCOUNTER — OFFICE VISIT (OUTPATIENT)
Dept: FAMILY MEDICINE CLINIC | Facility: CLINIC | Age: 57
End: 2022-10-21

## 2022-10-21 VITALS
WEIGHT: 165.9 LBS | BODY MASS INDEX: 29.39 KG/M2 | DIASTOLIC BLOOD PRESSURE: 98 MMHG | HEIGHT: 63 IN | OXYGEN SATURATION: 98 % | TEMPERATURE: 97.7 F | HEART RATE: 102 BPM | SYSTOLIC BLOOD PRESSURE: 142 MMHG

## 2022-10-21 DIAGNOSIS — F33.1 MODERATE EPISODE OF RECURRENT MAJOR DEPRESSIVE DISORDER: ICD-10-CM

## 2022-10-21 DIAGNOSIS — I10 PRIMARY HYPERTENSION: Primary | ICD-10-CM

## 2022-10-21 DIAGNOSIS — R40.0 DAYTIME SOMNOLENCE: ICD-10-CM

## 2022-10-21 PROCEDURE — 99214 OFFICE O/P EST MOD 30 MIN: CPT | Performed by: FAMILY MEDICINE

## 2022-10-21 RX ORDER — CHLORTHALIDONE 50 MG/1
50 TABLET ORAL DAILY
Qty: 90 TABLET | Refills: 1 | Status: SHIPPED | OUTPATIENT
Start: 2022-10-21

## 2022-10-21 NOTE — ASSESSMENT & PLAN NOTE
Hypertension is improving with treatment.  Weight loss.  Regular aerobic exercise.  Ambulatory blood pressure monitoring.  I Soledad change hydrochlorothiazide to chlorthalidone 50 mg daily.  Side effects discussed  Blood pressure will be reassessed in 3 months.
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
## 2022-12-01 RX ORDER — TRAZODONE HYDROCHLORIDE 50 MG/1
TABLET ORAL
Qty: 90 TABLET | Refills: 1 | Status: SHIPPED | OUTPATIENT
Start: 2022-12-01

## 2023-01-17 DIAGNOSIS — F41.1 GAD (GENERALIZED ANXIETY DISORDER): ICD-10-CM

## 2023-01-17 RX ORDER — BUSPIRONE HYDROCHLORIDE 7.5 MG/1
TABLET ORAL
Qty: 60 TABLET | Refills: 2 | OUTPATIENT
Start: 2023-01-17

## 2023-01-18 DIAGNOSIS — F41.1 GAD (GENERALIZED ANXIETY DISORDER): ICD-10-CM

## 2023-01-19 RX ORDER — BUSPIRONE HYDROCHLORIDE 7.5 MG/1
TABLET ORAL
Qty: 60 TABLET | Refills: 2 | Status: SHIPPED | OUTPATIENT
Start: 2023-01-19

## 2023-04-17 RX ORDER — LOSARTAN POTASSIUM 100 MG/1
TABLET ORAL
Qty: 90 TABLET | Refills: 1 | Status: SHIPPED | OUTPATIENT
Start: 2023-04-17

## 2023-04-17 RX ORDER — MELOXICAM 15 MG/1
TABLET ORAL
Qty: 90 TABLET | Refills: 1 | Status: SHIPPED | OUTPATIENT
Start: 2023-04-17

## 2023-04-19 ENCOUNTER — OFFICE VISIT (OUTPATIENT)
Dept: FAMILY MEDICINE CLINIC | Facility: CLINIC | Age: 58
End: 2023-04-19
Payer: MEDICAID

## 2023-04-19 VITALS
HEIGHT: 63 IN | WEIGHT: 162.4 LBS | TEMPERATURE: 97.3 F | OXYGEN SATURATION: 99 % | DIASTOLIC BLOOD PRESSURE: 88 MMHG | BODY MASS INDEX: 28.77 KG/M2 | HEART RATE: 81 BPM | SYSTOLIC BLOOD PRESSURE: 130 MMHG

## 2023-04-19 DIAGNOSIS — H53.9 VISION CHANGES: ICD-10-CM

## 2023-04-19 DIAGNOSIS — G51.0 BELL'S PALSY: Primary | ICD-10-CM

## 2023-04-19 DIAGNOSIS — Z12.31 ENCOUNTER FOR SCREENING MAMMOGRAM FOR MALIGNANT NEOPLASM OF BREAST: ICD-10-CM

## 2023-04-19 NOTE — ASSESSMENT & PLAN NOTE
Referred to ophthalmology as above   Body Location Override (Optional - Billing Will Still Be Based On Selected Body Map Location If Applicable): R jawline X Size Of Lesion In Cm (Optional): 0 Detail Level: Detailed Body Location Override (Optional - Billing Will Still Be Based On Selected Body Map Location If Applicable): L ant shoulder Body Location Override (Optional - Billing Will Still Be Based On Selected Body Map Location If Applicable): L chest

## 2023-04-19 NOTE — ASSESSMENT & PLAN NOTE
Improving without treatment, patient referred to ophthalmology for further evaluation of vision changes

## 2023-04-19 NOTE — PROGRESS NOTES
"       Date: 2023   Patient Name: Rajani Valentine  : 1965   MRN: [id number]     Chief Complaint:    Chief Complaint   Patient presents with   • Bell's Palsy     Follow up, patient reports right side of face is numb        History of Present Illness: Rajani Valentine is a 57 y.o. female who is here today to follow up for Bell's palsy.  Symptoms are slowly improving but she has now noticed significant changes in vision and feels like her eyes are not \" working together\".  She is unable to wear her current prescription of glasses because of this and has difficulty with peripheral vision as well.  She has not seen an ophthalmologist anytime recently.           Review of Systems:   Review of Systems   Constitutional: Negative for chills, fatigue and fever.   Eyes: Positive for double vision and visual disturbance.   Respiratory: Negative for cough and shortness of breath.    Cardiovascular: Negative for chest pain and palpitations.   Gastrointestinal: Negative for abdominal pain, constipation, diarrhea, nausea and vomiting.   Musculoskeletal: Negative for back pain and myalgias.   Neurological: Negative for dizziness and headache.   Psychiatric/Behavioral: Negative for depressed mood. The patient is not nervous/anxious.        Past Medical History:   Past Medical History:   Diagnosis Date   • Anxiety    • Bell's palsy    • Depression    • Esophageal reflux    • Hypertension    • Irritable bowel syndrome    • Pregnancy     3       Past Surgical History:   Past Surgical History:   Procedure Laterality Date   • ANKLE ARTHROPLASTY Left    • ANKLE SURGERY     • CHOLECYSTECTOMY     • CHOLECYSTECTOMY Bilateral    • COLPOSCOPY W/ BIOPSY / CURETTAGE      [facility]   • ENDOMETRIAL ABLATION     • TUBAL ABDOMINAL LIGATION         Family History:   Family History   Problem Relation Age of Onset   • Hypertension Mother    • Alzheimer's disease Mother    • Allergies Mother    • Skin cancer " Father    • Heart disease Father    • Allergies Sister    • Hypertension Brother    • No Known Problems Maternal Aunt    • No Known Problems Maternal Uncle    • No Known Problems Paternal Aunt    • Skin cancer Paternal Uncle    • Colon cancer Maternal Grandmother    • Dementia Maternal Grandfather    • Heart disease Paternal Grandmother    • No Known Problems Paternal Grandfather    • Alzheimer's disease Other        Social History:   Social History     Socioeconomic History   • Marital status:    • Number of children: 3   Tobacco Use   • Smoking status: Former     Packs/day: 0.50     Years: 37.00     Pack years: 18.50     Types: Cigarettes     Quit date:      Years since quittin.2   • Smokeless tobacco: Never   • Tobacco comments:     quit 2 years ago   Vaping Use   • Vaping Use: Never used   Substance and Sexual Activity   • Alcohol use: Yes     Comment: socially   • Drug use: No   • Sexual activity: Yes     Partners: Male     Birth control/protection: Surgical     Comment: BTL        Medications:     Current Outpatient Medications:   •  amLODIPine (NORVASC) 10 MG tablet, Take 1 tablet by mouth Daily., Disp: 90 tablet, Rfl: 1  •  busPIRone (BUSPAR) 7.5 MG tablet, TAKE ONE TABLET BY MOUTH TWICE A DAY, Disp: 60 tablet, Rfl: 2  •  chlorthalidone (HYGROTEN) 50 MG tablet, Take 1 tablet by mouth Daily., Disp: 90 tablet, Rfl: 1  •  cloNIDine (Catapres) 0.1 MG tablet, Take 1 tablet by mouth 2 (Two) Times a Day As Needed for High Blood Pressure (Blood pressure greater than 160/100)., Disp: 30 tablet, Rfl: 5  •  hydrOXYzine (ATARAX) 25 MG tablet, Take 1 tablet by mouth Every 8 (Eight) Hours As Needed for Anxiety., Disp: 60 tablet, Rfl: 2  •  losartan (COZAAR) 100 MG tablet, TAKE ONE TABLET BY MOUTH DAILY, Disp: 90 tablet, Rfl: 1  •  meloxicam (MOBIC) 15 MG tablet, TAKE ONE TABLET BY MOUTH DAILY, Disp: 90 tablet, Rfl: 1  •  metoprolol succinate XL (Toprol XL) 200 MG 24 hr tablet, Take 1 tablet by mouth  "Daily., Disp: 90 tablet, Rfl: 1  •  omeprazole (priLOSEC) 20 MG capsule, Take 1 capsule by mouth Daily., Disp: , Rfl:   •  sertraline (ZOLOFT) 100 MG tablet, Take 1.5 tablets by mouth Daily., Disp: 135 tablet, Rfl: 1  •  traZODone (DESYREL) 50 MG tablet, TAKE ONE TABLET BY MOUTH EVERY NIGHT AT BEDTIME, Disp: 90 tablet, Rfl: 1    Allergies:   Allergies   Allergen Reactions   • Diethylpropion Hcl Unknown - Low Severity   • Gatifloxacin Unknown - High Severity   • Valacyclovir Unknown - Low Severity       PHQ-2 Total Score: 0   PHQ-9 Total Score: 0     Physical Exam:  Vital Signs:   Vitals:    04/19/23 1355   BP: 130/88   BP Location: Left arm   Patient Position: Sitting   Cuff Size: Adult   Pulse: 81   Temp: 97.3 °F (36.3 °C)   TempSrc: Temporal   SpO2: 99%   Weight: 73.7 kg (162 lb 6.4 oz)   Height: 160 cm (63\")     Body mass index is 28.77 kg/m².     Physical Exam  Vitals and nursing note reviewed.   Constitutional:       Appearance: Normal appearance.   Eyes:      Extraocular Movements: Extraocular movements intact.      Pupils: Pupils are equal, round, and reactive to light.   Cardiovascular:      Rate and Rhythm: Normal rate and regular rhythm.      Heart sounds: Normal heart sounds. No murmur heard.  Pulmonary:      Effort: Pulmonary effort is normal.      Breath sounds: Normal breath sounds. No wheezing.   Skin:     General: Skin is warm.   Neurological:      Mental Status: She is alert and oriented to person, place, and time. Mental status is at baseline.      Comments: Right side of the face remains partially paralyzed, improved from previous exams   Psychiatric:         Mood and Affect: Mood normal.         Behavior: Behavior normal.           Assessment/Plan:   Diagnoses and all orders for this visit:    1. Bell's palsy (Primary)  Assessment & Plan:  Improving without treatment, patient referred to ophthalmology for further evaluation of vision changes    Orders:  -     Ambulatory Referral to " Ophthalmology    2. Vision changes  Assessment & Plan:  Referred to ophthalmology as above    Orders:  -     Ambulatory Referral to Ophthalmology    3. Encounter for screening mammogram for malignant neoplasm of breast  -     Mammo Screening Bilateral With CAD; Future         Follow Up:   Return in about 3 months (around 7/19/2023) for Annual physical.    Nisha Vegas DO  [facility] [place] [facility]

## 2023-04-24 ENCOUNTER — TELEMEDICINE (OUTPATIENT)
Dept: FAMILY MEDICINE CLINIC | Facility: CLINIC | Age: 58
End: 2023-04-24
Payer: MEDICAID

## 2023-04-24 VITALS
BODY MASS INDEX: 28.7 KG/M2 | HEIGHT: 63 IN | SYSTOLIC BLOOD PRESSURE: 152 MMHG | WEIGHT: 162 LBS | DIASTOLIC BLOOD PRESSURE: 100 MMHG

## 2023-04-24 DIAGNOSIS — I10 PRIMARY HYPERTENSION: Primary | ICD-10-CM

## 2023-04-24 DIAGNOSIS — I10 PRIMARY HYPERTENSION: ICD-10-CM

## 2023-04-24 RX ORDER — METOPROLOL SUCCINATE 200 MG/1
TABLET, EXTENDED RELEASE ORAL
Qty: 90 TABLET | Refills: 1 | Status: SHIPPED | OUTPATIENT
Start: 2023-04-24

## 2023-04-24 RX ORDER — HYDRALAZINE HYDROCHLORIDE 10 MG/1
10 TABLET, FILM COATED ORAL 3 TIMES DAILY
Qty: 180 TABLET | Refills: 1 | Status: SHIPPED | OUTPATIENT
Start: 2023-04-24

## 2023-04-24 NOTE — ASSESSMENT & PLAN NOTE
Hypertension is worsening.  Ambulatory blood pressure monitoring.  Add hydralazine 10 mg twice daily to 3 times daily, side effects discussed  Blood pressure will be reassessed in 2 weeks.

## 2023-04-24 NOTE — PROGRESS NOTES
Date: 2023   Patient Name: Rajani Valentine  : 1965   MRN: [id number]     Chief Complaint:    Chief Complaint   Patient presents with   • Hypertension     Patient reports since Saturday her blood pressure has increased, headache, very stressed. Blood pressure has been 175/106, 165/114, 160/111       History of Present Illness: Rajani Valentine is a 57 y.o. female who is here today to follow up for Hypertension.  She reports blood pressures worsened acutely recently and have been as high as 170/110.  She denies any changes in medications or stressors.  She has been working more recently but reports that her job is not any more stressful than normal.  She has been taking her clonidine which has been bringing down her blood pressure appropriately.           Review of Systems:   Review of Systems   Constitutional: Negative for chills, fatigue and fever.   Respiratory: Negative for cough and shortness of breath.    Cardiovascular: Negative for chest pain and palpitations.   Gastrointestinal: Negative for abdominal pain, constipation, diarrhea, nausea and vomiting.   Musculoskeletal: Negative for back pain and myalgias.   Neurological: Positive for headache. Negative for dizziness.   Psychiatric/Behavioral: Negative for depressed mood. The patient is not nervous/anxious.        Past Medical History:   Past Medical History:   Diagnosis Date   • Anxiety    • Bell's palsy    • Depression    • Esophageal reflux    • Hypertension    • Irritable bowel syndrome    • Pregnancy     3       Past Surgical History:   Past Surgical History:   Procedure Laterality Date   • ANKLE ARTHROPLASTY Left    • ANKLE SURGERY     • CHOLECYSTECTOMY     • CHOLECYSTECTOMY Bilateral    • COLPOSCOPY W/ BIOPSY / CURETTAGE      [facility]   • ENDOMETRIAL ABLATION     • TUBAL ABDOMINAL LIGATION         Family History:   Family History   Problem Relation Age of Onset   • Hypertension Mother    •  Alzheimer's disease Mother    • Allergies Mother    • Skin cancer Father    • Heart disease Father    • Allergies Sister    • Hypertension Brother    • No Known Problems Maternal Aunt    • No Known Problems Maternal Uncle    • No Known Problems Paternal Aunt    • Skin cancer Paternal Uncle    • Colon cancer Maternal Grandmother    • Dementia Maternal Grandfather    • Heart disease Paternal Grandmother    • No Known Problems Paternal Grandfather    • Alzheimer's disease Other        Social History:   Social History     Socioeconomic History   • Marital status:    • Number of children: 3   Tobacco Use   • Smoking status: Former     Packs/day: 0.50     Years: 37.00     Pack years: 18.50     Types: Cigarettes     Quit date:      Years since quittin.3   • Smokeless tobacco: Never   • Tobacco comments:     quit 2 years ago   Vaping Use   • Vaping Use: Never used   Substance and Sexual Activity   • Alcohol use: Yes     Comment: socially   • Drug use: No   • Sexual activity: Yes     Partners: Male     Birth control/protection: Surgical     Comment: BTL        Medications:     Current Outpatient Medications:   •  amLODIPine (NORVASC) 10 MG tablet, Take 1 tablet by mouth Daily., Disp: 90 tablet, Rfl: 1  •  busPIRone (BUSPAR) 7.5 MG tablet, TAKE ONE TABLET BY MOUTH TWICE A DAY, Disp: 60 tablet, Rfl: 2  •  chlorthalidone (HYGROTEN) 50 MG tablet, Take 1 tablet by mouth Daily., Disp: 90 tablet, Rfl: 1  •  cloNIDine (Catapres) 0.1 MG tablet, Take 1 tablet by mouth 2 (Two) Times a Day As Needed for High Blood Pressure (Blood pressure greater than 160/100)., Disp: 30 tablet, Rfl: 5  •  hydrOXYzine (ATARAX) 25 MG tablet, Take 1 tablet by mouth Every 8 (Eight) Hours As Needed for Anxiety., Disp: 60 tablet, Rfl: 2  •  losartan (COZAAR) 100 MG tablet, TAKE ONE TABLET BY MOUTH DAILY, Disp: 90 tablet, Rfl: 1  •  meloxicam (MOBIC) 15 MG tablet, TAKE ONE TABLET BY MOUTH DAILY, Disp: 90 tablet, Rfl: 1  •  omeprazole  "(priLOSEC) 20 MG capsule, Take 1 capsule by mouth Daily., Disp: , Rfl:   •  sertraline (ZOLOFT) 100 MG tablet, Take 1.5 tablets by mouth Daily., Disp: 135 tablet, Rfl: 1  •  traZODone (DESYREL) 50 MG tablet, TAKE ONE TABLET BY MOUTH EVERY NIGHT AT BEDTIME, Disp: 90 tablet, Rfl: 1  •  hydrALAZINE (APRESOLINE) 10 MG tablet, Take 1 tablet by mouth 3 (Three) Times a Day., Disp: 180 tablet, Rfl: 1  •  metoprolol succinate XL (TOPROL-XL) 200 MG 24 hr tablet, TAKE ONE TABLET BY MOUTH DAILY, Disp: 90 tablet, Rfl: 1    Allergies:   Allergies   Allergen Reactions   • Diethylpropion Hcl Unknown - Low Severity   • Gatifloxacin Unknown - High Severity   • Valacyclovir Unknown - Low Severity       PHQ-2 Total Score:     PHQ-9 Total Score:       Physical Exam:  Vital Signs:   Vitals:    04/24/23 1546 04/24/23 1623   BP:  152/100   Weight: 73.5 kg (162 lb)    Height: 160 cm (63\")      Body mass index is 28.7 kg/m².     Physical Exam  Vitals and nursing note reviewed.   Constitutional:       Appearance: Normal appearance.   HENT:      Head: Normocephalic.   Pulmonary:      Effort: Pulmonary effort is normal.   Neurological:      Mental Status: She is alert and oriented to person, place, and time.   Psychiatric:         Mood and Affect: Mood normal.         Behavior: Behavior normal.           Assessment/Plan:   Diagnoses and all orders for this visit:    1. Primary hypertension (Primary)  Assessment & Plan:  Hypertension is worsening.  Ambulatory blood pressure monitoring.  Add hydralazine 10 mg twice daily to 3 times daily, side effects discussed  Blood pressure will be reassessed in 2 weeks.    Orders:  -     hydrALAZINE (APRESOLINE) 10 MG tablet; Take 1 tablet by mouth 3 (Three) Times a Day.  Dispense: 180 tablet; Refill: 1         Follow Up:   Return for Already has an appointment.    Nisha Vegas, DO  [facility]    "

## 2023-05-18 ENCOUNTER — OFFICE VISIT (OUTPATIENT)
Dept: CARDIOLOGY | Facility: CLINIC | Age: 58
End: 2023-05-18
Payer: MEDICAID

## 2023-05-18 ENCOUNTER — TELEPHONE (OUTPATIENT)
Dept: FAMILY MEDICINE CLINIC | Facility: CLINIC | Age: 58
End: 2023-05-18
Payer: MEDICAID

## 2023-05-18 VITALS
OXYGEN SATURATION: 98 % | DIASTOLIC BLOOD PRESSURE: 84 MMHG | RESPIRATION RATE: 18 BRPM | WEIGHT: 163 LBS | HEIGHT: 63 IN | BODY MASS INDEX: 28.88 KG/M2 | HEART RATE: 69 BPM | SYSTOLIC BLOOD PRESSURE: 158 MMHG

## 2023-05-18 DIAGNOSIS — E78.5 HYPERLIPIDEMIA LDL GOAL <100: ICD-10-CM

## 2023-05-18 DIAGNOSIS — Z87.891 FORMER SMOKER: ICD-10-CM

## 2023-05-18 DIAGNOSIS — R06.09 EXERTIONAL DYSPNEA: ICD-10-CM

## 2023-05-18 DIAGNOSIS — R73.03 PREDIABETES: ICD-10-CM

## 2023-05-18 DIAGNOSIS — F33.1 MODERATE EPISODE OF RECURRENT MAJOR DEPRESSIVE DISORDER: ICD-10-CM

## 2023-05-18 DIAGNOSIS — I10 RESISTANT HYPERTENSION: ICD-10-CM

## 2023-05-18 DIAGNOSIS — I10 PRIMARY HYPERTENSION: Primary | ICD-10-CM

## 2023-05-18 DIAGNOSIS — R94.31 ABNORMAL FINDING ON EKG: ICD-10-CM

## 2023-05-18 DIAGNOSIS — I10 PRIMARY HYPERTENSION: ICD-10-CM

## 2023-05-18 PROBLEM — R06.02 SHORTNESS OF BREATH: Status: ACTIVE | Noted: 2023-05-18

## 2023-05-18 RX ORDER — HYDRALAZINE HYDROCHLORIDE 50 MG/1
50 TABLET, FILM COATED ORAL 3 TIMES DAILY
Qty: 90 TABLET | Refills: 1 | Status: SHIPPED | OUTPATIENT
Start: 2023-05-18

## 2023-05-18 NOTE — PROGRESS NOTES
[facility] [place]  [facility]IOLOG[facility]  [address] [place] [address]  Dept: [phone]  Dept Fax: [phone]    Rajani Valentine  1965    New Patient Office Note    History of Present Illness:  Rajani Valentine is a 57 y.o. female who presents to the clinic for Establish Care.  She has PMH significant for HTN, HLP, pre-DM.  She is single, 3 children.  Rare EtOH, denies drugs, former smoker who smokes 1 cigarette occasionally per week, half a pack per day [id number] history.  She currently has been walking.  Works with Alzheimer's patients.  Positive family history including mother  by HTN from brain aneurysm.  Father MI age 66, HTN.  Brother-HTN.  PGM also cardiovascular history.    She presents today to establish care and as referral from PCP for evaluation of resistant HTN.  She is on multiple antihypertensives and remains resistant to improve blood pressure, on Toprol, losartan, Norvasc, chlorthalidone, hydralazine, clonidine as needed.  She feels her blood pressure had been well controlled until following her COVID vaccine when he 21.  Additionally she states she has noticed she has been more short of breath specifically with mild activities recently.  She did just have diagnosis of Bell's palsy about 7 months ago, had ED visit for [facility].  Will obtain records.  She denies all additional cardiac complaints no CP, edema, LE edema, orthopnea, PND.  She does have extremely rare palpitations feel like fluttering for seconds may be 1-2 times monthly.  She stated today that she was told she had atrial fibrillation at ED visit many years ago.  No record of this.  Today she also states she is felt dizzy at times and has been more fatigued than normal.  She does appreciate stress and anxiety that is not well controlled.  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  This does represent new change from historical record of 2016 where  inferior wall was normal.  She is going to be worked up for sleep apnea by PCP, I did encourage that she proceed with this evaluation.  She states to be eating low-salt diet staying as physically active as possible and states full compliance with her antihypertensive regimen.  Home checks of her blood pressure have consistently been 150-160 systolic with higher diastolic readings.  At this time we will proceed with evaluation of labs, echo, renal artery duplex to rule out renal artery stenosis.  Additionally advised to stop Mobic and avoid NSAIDs.  Physical exam today relatively normal, with regard to elevated blood pressure in office today 180/100 bilateral arms.  Did advise for her to take a clonidine today.  Additionally will increase hydralazine to 50 three times daily, advised to start twice daily see how well tolerated and increase as needed.  We will follow closely in 2 weeks.    The following portions of the patient's history were reviewed and updated as appropriate: allergies, current medications, past family history, past medical history, past social history, past surgical history, and problem list.    Medications:  amLODIPine  busPIRone  chlorthalidone  cloNIDine  hydrALAZINE  hydrOXYzine  losartan  meloxicam  metoprolol succinate XL  omeprazole  sertraline  traZODone    Subjective  Allergies   Allergen Reactions   • Diethylpropion Hcl Unknown - Low Severity   • Gatifloxacin Unknown - High Severity   • Valacyclovir Unknown - Low Severity        Past Medical History:   Diagnosis Date   • Anxiety 2021   • Bell's palsy    • Depression 2021   • Esophageal reflux    • Hypertension    • Irritable bowel syndrome 2001   • Pregnancy     3       Past Surgical History:   Procedure Laterality Date   • ANKLE ARTHROPLASTY Left 2009   • ANKLE SURGERY     • CHOLECYSTECTOMY     • CHOLECYSTECTOMY Bilateral 2001   • COLPOSCOPY W/ BIOPSY / CURETTAGE  2022    [facility]   • ENDOMETRIAL ABLATION     • TUBAL ABDOMINAL  "LIGATION         Family History   Problem Relation Age of Onset   • Hypertension Mother    • Alzheimer's disease Mother    • Allergies Mother    • Skin cancer Father    • Heart disease Father    • Allergies Sister    • Hypertension Brother    • No Known Problems Maternal Aunt    • No Known Problems Maternal Uncle    • No Known Problems Paternal Aunt    • Skin cancer Paternal Uncle    • Colon cancer Maternal Grandmother    • Dementia Maternal Grandfather    • Heart disease Paternal Grandmother    • No Known Problems Paternal Grandfather    • Alzheimer's disease Other         Social History     Socioeconomic History   • Marital status:    • Number of children: 3   Tobacco Use   • Smoking status: Former     Packs/day: 0.50     Years: 37.00     Pack years: 18.50     Types: Cigarettes     Quit date:      Years since quittin.3   • Smokeless tobacco: Never   • Tobacco comments:     quit 2 years ago   Vaping Use   • Vaping Use: Never used   Substance and Sexual Activity   • Alcohol use: Yes     Comment: socially   • Drug use: No   • Sexual activity: Yes     Partners: Male     Birth control/protection: Surgical     Comment: BTL        Review of Systems   Constitutional: Positive for fatigue.   Respiratory: Positive for shortness of breath.    Neurological: Positive for dizziness, facial asymmetry and headache.   Psychiatric/Behavioral: Positive for stress. The patient is nervous/anxious.    All other systems reviewed and are negative.      Cardiovascular Procedures    ECHO/MUGA:   STRESS TESTS:   CARDIAC CATH:   DEVICES:   HOLTER:   CT/MRI:   VASCULAR:   CARDIOTHORACIC:     Objective  Vitals:    23 1507   BP: 158/84   BP Location: Left arm   Patient Position: Sitting   Cuff Size: Adult   Pulse: 69   Resp: 18   SpO2: 98%   Weight: 73.9 kg (163 lb)   Height: 160 cm (63\")   PainSc: 0-No pain       Physical Exam  Vitals reviewed.   Constitutional:       General: Awake.      Appearance: Normal and healthy " appearance. Not in distress.   Neck:      Vascular: No JVR. JVD normal.   Pulmonary:      Effort: Pulmonary effort is normal.      Breath sounds: Normal breath sounds. No wheezing. No rhonchi. No rales.   Chest:      Chest wall: Not tender to palpatation.   Cardiovascular:      PMI at left midclavicular line. Normal rate. Regular rhythm. Normal S1. Normal S2.      Murmurs: There is no murmur.      No gallop. No click. No rub.   Pulses:     Intact distal pulses.   Edema:     Peripheral edema absent.   Abdominal:      General: Bowel sounds are normal.      Palpations: Abdomen is soft.      Tenderness: There is no abdominal tenderness.   Musculoskeletal: Normal range of motion.         General: No tenderness. Skin:     General: Skin is warm and dry.   Neurological:      General: No focal deficit present.      Mental Status: Alert and oriented to person, place and time.   Psychiatric:         Behavior: Behavior is cooperative.          Diagnostic Data    ECG 12 Lead    Date/Time: 5/18/2023 4:44 PM  Performed by: Nichelle Bustos APRN  Authorized by: Nichelle Bustos APRN   Comparison: compared with previous ECG from 8/18/2016  Comparison to previous ECG: Now suggestive of old  inferior wall MI  Rhythm: sinus rhythm  Rate: normal  BPM: 61  QRS axis: normal  Other findings: poor R wave progression    Clinical impression: abnormal EKG            Advance Care Planning        Assessment and Plan  Diagnoses and all orders for this visit:    1. Primary hypertension (Primary)  Many medications including Toprol 200 daily, losartan 100 daily at bedtime, Norvasc 10 daily AM, chlorthalidone 50 daily AM, hydralazine 10 twice daily.  Home BPs have been elevated greater than 150 consistently, in office today 180/100 on recheck.  We will try to maximize her current medications, increase hydralazine to 50 3 times daily starting with taking twice a day.  Advised to take clonidine today.  Additionally will evaluate for  renal artery stenosis and other secondary causes.  -     hydrALAZINE (APRESOLINE) 50 MG tablet; Take 1 tablet by mouth 3 (Three) Times a Day.  Dispense: 90 tablet; Refill: 1  -     High Sensitivity CRP; Future  -     TSH Rfx On Abnormal To Free T4; Future  -     Comprehensive Metabolic Panel; Future  -     CBC & Differential; Future    2. Hyperlipidemia LDL goal <100   4 April 2022.  No medication.  We will reevaluate fasting labs.  Further recommendation pending finding.  -     Lipid Panel; Future  -     CK; Future  -     Comprehensive Metabolic Panel; Future    3. Former smoker  She fully quit around 2022 however states she still has an occasional cigarette maybe 1 to 2/week.  Half a pack per day [id number] history.    4. Prediabetes  A1c 5.7 historically.  We will recheck A1c.  -     Hemoglobin A1c; Future    5. Resistant hypertension  As above, on many medications without improvement pressure.  As above.  -     Adult Transthoracic Echo Complete W/ Cont if Necessary Per Protocol; Future  -     Duplex Renal Artery - Bilateral Complete CAR; Future    6. Exertional dyspnea  Has noticed has come on recently with mild activities.  She is former smoker with occasional cigarette.  Labs and echo for further review.  -     High Sensitivity CRP; Future  -     proBNP; Future  -     CBC & Differential; Future    7.  Abnormal finding on EKG  Change noted in inferior wall now suggestive of old MI.  Not present on 2016 EKG.  Denies CP, however is having some exertional dyspnea.  We will consider evaluation with stress testing once her blood pressure is under control.      [place] Palsy 2022, will pull all records from [facility] as she states doctors felt this was related to HTN.     No follow-ups on file.    Nichelle Bustos, SKY  05/18/2023    Part of this note may be an electronic transcription/translation of spoken language to printed text using the Dragon Dictation System.

## 2023-05-18 NOTE — TELEPHONE ENCOUNTER
Caller: Rajani Valentine    Relationship: Self    Best call back number: [phone]    What form or medical record are you requesting: MED LIST FOR CARDIOLOGIST     Who is requesting this form or medical record from you: PATIENT     How would you like to receive the form or medical records (pick-up, mail, fax):    If fax, what is the fax number:   If mail, what is the address:   If pick-up, provide patient with address and location details    Timeframe paperwork needed: ASAP

## 2023-05-18 NOTE — TELEPHONE ENCOUNTER
Caller: Rajani Valentine    Relationship: Self    Best call back number: [phone]    Requested Prescriptions:   Requested Prescriptions     Pending Prescriptions Disp Refills   • sertraline (ZOLOFT) 100 MG tablet 135 tablet 1     Sig: Take 1.5 tablets by mouth Daily.   • amLODIPine (NORVASC) 10 MG tablet 90 tablet 1     Sig: Take 1 tablet by mouth Daily.        Pharmacy where request should be sent: [facility] [id number] [address] AT [facility] [address] - [phone] [facility] [phone] FX     Last office visit with prescribing clinician: 4/19/2023   Last telemedicine visit with prescribing clinician: 4/24/2023   Next office visit with prescribing clinician: Visit date not found     Additional details provided by patient: PATIENT IS OUT OF BOTH    Does the patient have less than a 3 day supply:  [x] Yes  [] No    Would you like a call back once the refill request has been completed: [] Yes [] No    If the office needs to give you a call back, can they leave a voicemail: [] Yes [] No    Bronwyn Miramontes Rep   05/18/23 11:04 EDT

## 2023-05-19 ENCOUNTER — CLINICAL SUPPORT (OUTPATIENT)
Dept: CARDIOLOGY | Facility: CLINIC | Age: 58
End: 2023-05-19
Payer: MEDICAID

## 2023-05-19 ENCOUNTER — TELEPHONE (OUTPATIENT)
Dept: CARDIOLOGY | Facility: CLINIC | Age: 58
End: 2023-05-19
Payer: MEDICAID

## 2023-05-19 DIAGNOSIS — R06.09 EXERTIONAL DYSPNEA: ICD-10-CM

## 2023-05-19 DIAGNOSIS — E78.5 HYPERLIPIDEMIA LDL GOAL <100: ICD-10-CM

## 2023-05-19 DIAGNOSIS — I10 PRIMARY HYPERTENSION: ICD-10-CM

## 2023-05-19 DIAGNOSIS — I10 RESISTANT HYPERTENSION: ICD-10-CM

## 2023-05-19 DIAGNOSIS — R73.03 PREDIABETES: ICD-10-CM

## 2023-05-19 NOTE — TELEPHONE ENCOUNTER
----- Message from SKY Donaldson sent at 5/18/2023  4:45 PM EDT -----  Can either of you please contact [facility] in [place] to obtain records from ED on this patient from last year? She should of had CT scans and a full workup due to her bells palsy

## 2023-05-20 LAB
ALBUMIN SERPL-MCNC: 4.6 G/DL (ref 3.8–4.9)
ALBUMIN/GLOB SERPL: 2.2 {RATIO} (ref 1.2–2.2)
ALP SERPL-CCNC: 137 IU/L (ref 44–121)
ALT SERPL-CCNC: 15 IU/L (ref 0–32)
AST SERPL-CCNC: 20 IU/L (ref 0–40)
BASOPHILS # BLD AUTO: 0 X10E3/UL (ref 0–0.2)
BASOPHILS NFR BLD AUTO: 1 %
BILIRUB SERPL-MCNC: 0.7 MG/DL (ref 0–1.2)
BUN SERPL-MCNC: 16 MG/DL (ref 6–24)
BUN/CREAT SERPL: 15 (ref 9–23)
CALCIUM SERPL-MCNC: 9.7 MG/DL (ref 8.7–10.2)
CHLORIDE SERPL-SCNC: 106 MMOL/L (ref 96–106)
CHOLEST SERPL-MCNC: 220 MG/DL (ref 100–199)
CK SERPL-CCNC: 106 U/L (ref 32–182)
CO2 SERPL-SCNC: 23 MMOL/L (ref 20–29)
CREAT SERPL-MCNC: 1.1 MG/DL (ref 0.57–1)
CRP SERPL HS-MCNC: 0.69 MG/L (ref 0–3)
EGFRCR SERPLBLD CKD-EPI 2021: 59 ML/MIN/1.73
EOSINOPHIL # BLD AUTO: 0.1 X10E3/UL (ref 0–0.4)
EOSINOPHIL NFR BLD AUTO: 2 %
ERYTHROCYTE [DISTWIDTH] IN BLOOD BY AUTOMATED COUNT: 14.1 % (ref 11.7–15.4)
GLOBULIN SER CALC-MCNC: 2.1 G/DL (ref 1.5–4.5)
GLUCOSE SERPL-MCNC: 97 MG/DL (ref 70–99)
HBA1C MFR BLD: 5.6 % (ref 4.8–5.6)
HCT VFR BLD AUTO: 40.1 % (ref 34–46.6)
HDLC SERPL-MCNC: 43 MG/DL
HGB BLD-MCNC: 13.3 G/DL (ref 11.1–15.9)
IMM GRANULOCYTES # BLD AUTO: 0 X10E3/UL (ref 0–0.1)
IMM GRANULOCYTES NFR BLD AUTO: 0 %
LDLC SERPL CALC-MCNC: 156 MG/DL (ref 0–99)
LYMPHOCYTES # BLD AUTO: 1.3 X10E3/UL (ref 0.7–3.1)
LYMPHOCYTES NFR BLD AUTO: 32 %
MCH RBC QN AUTO: 28.9 PG (ref 26.6–33)
MCHC RBC AUTO-ENTMCNC: 33.2 G/DL (ref 31.5–35.7)
MCV RBC AUTO: 87 FL (ref 79–97)
MONOCYTES # BLD AUTO: 0.4 X10E3/UL (ref 0.1–0.9)
MONOCYTES NFR BLD AUTO: 9 %
NEUTROPHILS # BLD AUTO: 2.4 X10E3/UL (ref 1.4–7)
NEUTROPHILS NFR BLD AUTO: 56 %
NT-PROBNP SERPL-MCNC: 122 PG/ML (ref 0–287)
PLATELET # BLD AUTO: 210 X10E3/UL (ref 150–450)
POTASSIUM SERPL-SCNC: 3.9 MMOL/L (ref 3.5–5.2)
PROT SERPL-MCNC: 6.7 G/DL (ref 6–8.5)
RBC # BLD AUTO: 4.61 X10E6/UL (ref 3.77–5.28)
SODIUM SERPL-SCNC: 143 MMOL/L (ref 134–144)
TRIGL SERPL-MCNC: 117 MG/DL (ref 0–149)
TSH SERPL DL<=0.005 MIU/L-ACNC: 1.26 UIU/ML (ref 0.45–4.5)
VLDLC SERPL CALC-MCNC: 21 MG/DL (ref 5–40)
WBC # BLD AUTO: 4.2 X10E3/UL (ref 3.4–10.8)

## 2023-05-22 ENCOUNTER — TELEPHONE (OUTPATIENT)
Dept: CARDIOLOGY | Facility: CLINIC | Age: 58
End: 2023-05-22
Payer: MEDICAID

## 2023-05-22 DIAGNOSIS — E78.5 HYPERLIPIDEMIA LDL GOAL <100: Primary | ICD-10-CM

## 2023-05-22 RX ORDER — AMLODIPINE BESYLATE 10 MG/1
10 TABLET ORAL DAILY
Qty: 90 TABLET | Refills: 1 | Status: SHIPPED | OUTPATIENT
Start: 2023-05-22

## 2023-05-22 RX ORDER — SERTRALINE HYDROCHLORIDE 100 MG/1
150 TABLET, FILM COATED ORAL DAILY
Qty: 135 TABLET | Refills: 1 | Status: SHIPPED | OUTPATIENT
Start: 2023-05-22

## 2023-05-22 RX ORDER — ROSUVASTATIN CALCIUM 10 MG/1
10 TABLET, COATED ORAL NIGHTLY
Qty: 30 TABLET | Refills: 2 | Status: SHIPPED | OUTPATIENT
Start: 2023-05-22

## 2023-05-22 NOTE — TELEPHONE ENCOUNTER
Phone call to pt and she verbally understood and her BP this morning not resting was 143/100 and resting was 128/78 yesterday, she is agreeable to start Crestor to [facility].

## 2023-05-22 NOTE — TELEPHONE ENCOUNTER
----- Message from SKY Donaldson sent at 5/22/2023  8:35 AM EDT -----  Her kidney function is very mildly abnormal, likely due to her water pill. Have her hydrate well. We will watch this.     Her cholesterol is high 220, bad cholesterol is 156 and should be less than 100. Her ASCVD 10 year risk for cardiac events is 15%, which means she has a 15% risk of a cardiac event in the  next 10 yrs. Additionally this means she needs cholesterol lowering therapy. If she is agreeable I would like to send crestor for her to take at night time with full glass of water. She will need to work on dietary changes, less fatty meats and more lean meats such as chicken, fish, turkey, bison. Also less sugar.     The remainder of labs checked are normal.       How is her Blood pressure??

## 2023-05-22 NOTE — TELEPHONE ENCOUNTER
Phone call to pt and she verbally understood and her BP this morning not resting was 143/100 and resting was 128/78 yesterday, she is agreeable to start Crestor to [facility].         Note         Annetta Morales MA 1 hour ago (11:35 AM)     LT  ----- Message from SKY Donaldson sent at 5/22/2023  8:35 AM EDT -----  Her kidney function is very mildly abnormal, likely due to her water pill. Have her hydrate well. We will watch this.      Her cholesterol is high 220, bad cholesterol is 156 and should be less than 100. Her ASCVD 10 year risk for cardiac events is 15%, which means she has a 15% risk of a cardiac event in the  next 10 yrs. Additionally this means she needs cholesterol lowering therapy. If she is agreeable I would like to send crestor for her to take at night time with full glass of water. She will need to work on dietary changes, less fatty meats and more lean meats such as chicken, fish, turkey, bison. Also less sugar.      The remainder of labs checked are normal.         How is her Blood pressure??

## 2023-05-24 LAB
ALDOST SERPL-MCNC: 9.9 NG/DL (ref 0–30)
Lab: NORMAL

## 2023-05-26 LAB
DOPAMINE SERPL-MCNC: <30 PG/ML (ref 0–48)
EPINEPH PLAS-MCNC: 39 PG/ML (ref 0–62)
Lab: NORMAL
METANEPH FREE SERPL-MCNC: 33.6 PG/ML (ref 0–88)
NOREPINEPH PLAS-MCNC: 386 PG/ML (ref 0–874)
NORMETANEPHRINE SERPL-MCNC: 52.2 PG/ML (ref 0–244)

## 2023-05-30 ENCOUNTER — TELEPHONE (OUTPATIENT)
Dept: CARDIOLOGY | Facility: CLINIC | Age: 58
End: 2023-05-30

## 2023-05-30 NOTE — TELEPHONE ENCOUNTER
Left message for patient to call back and reschedule appointments. HUB to transfer call to the office.

## 2023-05-31 DIAGNOSIS — F41.1 GAD (GENERALIZED ANXIETY DISORDER): ICD-10-CM

## 2023-05-31 RX ORDER — HYDROXYZINE HYDROCHLORIDE 25 MG/1
TABLET, FILM COATED ORAL
Qty: 60 TABLET | Refills: 2 | Status: SHIPPED | OUTPATIENT
Start: 2023-05-31

## 2023-06-09 ENCOUNTER — OFFICE VISIT (OUTPATIENT)
Dept: CARDIOLOGY | Facility: CLINIC | Age: 58
End: 2023-06-09
Payer: MEDICAID

## 2023-06-09 VITALS
SYSTOLIC BLOOD PRESSURE: 138 MMHG | WEIGHT: 164 LBS | BODY MASS INDEX: 29.06 KG/M2 | DIASTOLIC BLOOD PRESSURE: 80 MMHG | OXYGEN SATURATION: 96 % | TEMPERATURE: 98 F | RESPIRATION RATE: 18 BRPM | HEIGHT: 63 IN | HEART RATE: 65 BPM

## 2023-06-09 DIAGNOSIS — E78.5 HYPERLIPIDEMIA LDL GOAL <100: ICD-10-CM

## 2023-06-09 DIAGNOSIS — R73.03 PREDIABETES: ICD-10-CM

## 2023-06-09 DIAGNOSIS — I10 PRIMARY HYPERTENSION: Primary | ICD-10-CM

## 2023-06-09 DIAGNOSIS — R06.02 SHORTNESS OF BREATH: ICD-10-CM

## 2023-06-09 DIAGNOSIS — R94.31 ABNORMAL FINDING ON EKG: ICD-10-CM

## 2023-06-09 DIAGNOSIS — Z87.891 FORMER SMOKER: ICD-10-CM

## 2023-06-09 PROCEDURE — 3079F DIAST BP 80-89 MM HG: CPT

## 2023-06-09 PROCEDURE — 3075F SYST BP GE 130 - 139MM HG: CPT

## 2023-06-09 PROCEDURE — 99214 OFFICE O/P EST MOD 30 MIN: CPT

## 2023-06-09 NOTE — PROGRESS NOTES
[facility] [place]  [facility] CARDIOLOGY  [address] [place] [address]  Dept: [phone]  Dept Fax: [phone]    Rajani Valentine  1965    Follow Up Office Visit Note    History of Present Illness:  Rajani Valentine is a 57 y.o. female who presents to the clinic for Follow up HTN, shortness of breath.  She states to be doing better today, denies major complaints of shortness of breath, states her blood pressure has improved significantly, she did stop caffeine, eating low-salt diet.  Last visit hydralazine was increased to 50 twice daily, she states when she took her blood pressure dropped down to the 120s and made her feel bad.  He is now taking hydralazine 10 3 times a day, BP today is still mildly elevated 140/80 and congruent with her home readings she is getting systolic 130-140 over 80s to 90s.  She is scheduled for renal artery duplex in [place] July 19, advised to keep that appointment.  Recent labs revealed elevated , , Crestor was started at 10 daily, she feels she is tolerating well, denies complaints.  She denies all other complaints today.  We did discuss further evaluation with stress testing for abnormality on her baseline ECG now that her BP has improved, at this time she would like to wait on that since she is feeling better.  We discussed her propria blood pressure levels today with goal of around 120/80, advised today that she take hydralazine 25 twice daily, and continue Toprol, losartan, Norvasc, chlorthalidone.  We will follow closely 3 months or sooner if indicated.    The following portions of the patient's history were reviewed and updated as appropriate: allergies, current medications, past family history, past medical history, past social history, past surgical history, and problem list.    Medications:  amLODIPine  busPIRone  chlorthalidone  cloNIDine  hydrALAZINE  hydrOXYzine  losartan  meloxicam  metoprolol succinate  XL  omeprazole  rosuvastatin  sertraline  traZODone    Subjective  Allergies   Allergen Reactions    Diethylpropion Hcl Unknown - Low Severity    Gatifloxacin Unknown - High Severity    Valacyclovir Unknown - Low Severity        Past Medical History:   Diagnosis Date    Anxiety     Bell's palsy     Depression     Esophageal reflux     Hypertension     Irritable bowel syndrome 2001    Pregnancy     3       Past Surgical History:   Procedure Laterality Date    ANKLE ARTHROPLASTY Left 2009    ANKLE SURGERY      CHOLECYSTECTOMY      CHOLECYSTECTOMY Bilateral     COLPOSCOPY W/ BIOPSY / CURETTAGE      [facility]    ENDOMETRIAL ABLATION      TUBAL ABDOMINAL LIGATION         Family History   Problem Relation Age of Onset    Hypertension Mother     Alzheimer's disease Mother     Allergies Mother     Skin cancer Father     Heart disease Father     Allergies Sister     Hypertension Brother     No Known Problems Maternal Aunt     No Known Problems Maternal Uncle     No Known Problems Paternal Aunt     Skin cancer Paternal Uncle     Colon cancer Maternal Grandmother     Dementia Maternal Grandfather     Heart disease Paternal Grandmother     No Known Problems Paternal Grandfather     Alzheimer's disease Other         Social History     Socioeconomic History    Marital status:     Number of children: 3   Tobacco Use    Smoking status: Former     Packs/day: 0.50     Years: 37.00     Pack years: 18.50     Types: Cigarettes     Quit date:      Years since quittin.4    Smokeless tobacco: Never    Tobacco comments:     quit 2 years ago   Vaping Use    Vaping Use: Never used   Substance and Sexual Activity    Alcohol use: Yes     Comment: socially    Drug use: No    Sexual activity: Yes     Partners: Male     Birth control/protection: Surgical     Comment: BTL        Review of Systems   All other systems reviewed and are negative.    Cardiovascular Procedures    ECHO/MUGA:   STRESS TESTS:  "  CARDIAC CATH:   DEVICES:   HOLTER:   CT/MRI:   VASCULAR:   CARDIOTHORACIC:     Objective  Vitals:    06/09/23 1132   BP: 138/80   BP Location: Right arm   Patient Position: Lying   Cuff Size: Adult   Pulse: 65   Resp: 18   Temp: 98 °F (36.7 °C)   TempSrc: Infrared   SpO2: 96%   Weight: 74.4 kg (164 lb)   Height: 160 cm (63\")   PainSc: 0-No pain     Body mass index is 29.05 kg/m².     Physical Exam  Constitutional:       General: Awake.      Appearance: Normal and healthy appearance. Not in distress.   Neck:      Vascular: No JVR. JVD normal.   Pulmonary:      Effort: Pulmonary effort is normal.      Breath sounds: Normal breath sounds. No wheezing. No rhonchi. No rales.   Chest:      Chest wall: Not tender to palpatation.   Cardiovascular:      PMI at left midclavicular line. Normal rate. Regular rhythm. Normal S1. Normal S2.       Murmurs: There is no murmur.      No gallop.  No click. No rub.   Pulses:     Intact distal pulses.   Edema:     Peripheral edema absent.   Abdominal:      General: Bowel sounds are normal.      Palpations: Abdomen is soft.      Tenderness: There is no abdominal tenderness.   Musculoskeletal: Normal range of motion.         General: No tenderness. Skin:     General: Skin is warm and dry.   Neurological:      General: No focal deficit present.      Mental Status: Alert and oriented to person, place and time.   Psychiatric:         Behavior: Behavior is cooperative.        Diagnostic Data  Procedures    Advance Care Planning          Assessment and Plan  Diagnoses and all orders for this visit:    1. Primary hypertension (Primary)  Improving, however today mild elevation, much better from last visit.  Hydralazine was increased to 50 twice daily however she did not tolerate due to side effects, she is taking hydralazine 10 3 times daily, Toprol 200 daily, losartan 100 p.m., Norvasc 10 AM, chlorthalidone 50 a.m., as needed clonidine, BP today 140/80 on recheck, consistent with her home " readings as above.  Eating low-salt, avoiding caffeine.  Encouraged continued efforts.  We will go for ration of sleep apnea soon, also will be having renal artery duplex [place] July 19.  At this moment advised to take hydralazine 25 twice daily, continue to monitor and record BP readings at home.    2. Shortness of breath  No major complaint today.  Echo normal EF with borderline RV dilation, normal RVSP.  Observe for now.  She is former smoker, rare cigarettes now.    3. Abnormal finding on EKG  Inferior wall suggestive of old MI last visit, change from previous readings.  We discussed further evaluation with stress testing now that BP is under control.  She is not agreeable at this time.  Will discuss at follow-up visit.    4. Hyperlipidemia LDL goal <100  , trig 117 2023.  ASCVD 10-year risk 18.7%, started Crestor 10 daily likely will need titration in doses.  We will check lipid, CK, CMP at follow-up visit.    5. Former smoker  Still smoking on extremely rare occasions maybe 1 cigarette every now and then.    6. Prediabetes  A1c 5.6 now, improved from last.         Return in about 3 months (around 9/9/2023) for Recheck, Nichelle SWANSON.    SKY Donaldson  06/09/2023    Part of this note may be an electronic transcription/translation of spoken language to printed text using the Dragon Dictation System.

## 2023-07-11 ENCOUNTER — TELEPHONE (OUTPATIENT)
Dept: CARDIOLOGY | Facility: CLINIC | Age: 58
End: 2023-07-11

## 2023-07-11 NOTE — TELEPHONE ENCOUNTER
think the latter blood pressure looks ok. Is she still taking the hydralazine 25 mg twice daily?     I would have her to come in for in office BP check with all of her medications tomorrow and we will decide if we need to make further recommendations. Tell her to take all of her regularly scheduled medication that she normally takes before coming. .       PT SAID SHE IS TAKING THE HYDRALAZINE AND SHE WILL BE HERE TOMORROW

## 2023-07-11 NOTE — TELEPHONE ENCOUNTER
Caller: Rajani Valentine    Relationship to patient: Self    Best call back number: [phone]    Patient is needing: A CLINICAL STAFF MEMBER TO CALL BACK IN REGARDS TO HER BP AND HER DIZZINESS. PT STATES SHE TOOK THE  EMERGENCY MEDICATION, PLEASE ADVISE THANK YOU      170/98 125 HR YESTERDAY'S READING

## 2023-07-11 NOTE — TELEPHONE ENCOUNTER
Pt blood pressure was 170/98 p 125 930 yesterday morning . Pt took b/p at 4 today it was 137/93. P 63 .  Pt took her clonidine when it started to high  . Pt was asked to come tomorrow for blood pressure and bring her machine .  Please advise ?

## 2023-07-17 PROBLEM — R00.2 PALPITATIONS: Status: ACTIVE | Noted: 2023-07-17

## 2023-07-17 PROBLEM — Z72.0 TOBACCO USE: Status: ACTIVE | Noted: 2023-07-17

## 2023-07-17 PROBLEM — Z87.891 FORMER SMOKER: Status: RESOLVED | Noted: 2023-05-18 | Resolved: 2023-07-17

## 2023-07-25 DIAGNOSIS — F41.1 GAD (GENERALIZED ANXIETY DISORDER): ICD-10-CM

## 2023-07-26 RX ORDER — BUSPIRONE HYDROCHLORIDE 7.5 MG/1
TABLET ORAL
Qty: 60 TABLET | Refills: 2 | Status: SHIPPED | OUTPATIENT
Start: 2023-07-26

## 2023-08-14 RX ORDER — TRAZODONE HYDROCHLORIDE 50 MG/1
TABLET ORAL
Qty: 90 TABLET | Refills: 1 | Status: SHIPPED | OUTPATIENT
Start: 2023-08-14

## 2023-12-07 DIAGNOSIS — I10 PRIMARY HYPERTENSION: ICD-10-CM

## 2023-12-07 RX ORDER — METOPROLOL SUCCINATE 200 MG/1
TABLET, EXTENDED RELEASE ORAL
Qty: 90 TABLET | Refills: 1 | Status: SHIPPED | OUTPATIENT
Start: 2023-12-07

## 2023-12-07 RX ORDER — LOSARTAN POTASSIUM 100 MG/1
TABLET ORAL
Qty: 90 TABLET | Refills: 1 | Status: SHIPPED | OUTPATIENT
Start: 2023-12-07

## 2024-04-05 DIAGNOSIS — F41.1 GAD (GENERALIZED ANXIETY DISORDER): ICD-10-CM

## 2024-04-05 RX ORDER — BUSPIRONE HYDROCHLORIDE 7.5 MG/1
7.5 TABLET ORAL 2 TIMES DAILY
Qty: 60 TABLET | Refills: 2 | Status: SHIPPED | OUTPATIENT
Start: 2024-04-05

## 2024-04-21 DIAGNOSIS — F41.1 GAD (GENERALIZED ANXIETY DISORDER): ICD-10-CM

## 2024-04-23 RX ORDER — HYDROXYZINE HYDROCHLORIDE 25 MG/1
TABLET, FILM COATED ORAL
Qty: 60 TABLET | Refills: 2 | Status: SHIPPED | OUTPATIENT
Start: 2024-04-23

## 2024-05-07 ENCOUNTER — OFFICE VISIT (OUTPATIENT)
Dept: FAMILY MEDICINE CLINIC | Facility: CLINIC | Age: 59
End: 2024-05-07
Payer: MEDICAID

## 2024-05-07 VITALS
SYSTOLIC BLOOD PRESSURE: 146 MMHG | DIASTOLIC BLOOD PRESSURE: 90 MMHG | HEART RATE: 60 BPM | TEMPERATURE: 97.9 F | BODY MASS INDEX: 29.77 KG/M2 | HEIGHT: 63 IN | OXYGEN SATURATION: 99 % | WEIGHT: 168 LBS

## 2024-05-07 DIAGNOSIS — R53.83 FATIGUE, UNSPECIFIED TYPE: Primary | ICD-10-CM

## 2024-05-07 DIAGNOSIS — R73.03 PREDIABETES: ICD-10-CM

## 2024-05-07 DIAGNOSIS — E78.5 HYPERLIPIDEMIA LDL GOAL <100: ICD-10-CM

## 2024-05-07 DIAGNOSIS — E66.3 OVERWEIGHT: ICD-10-CM

## 2024-05-07 PROCEDURE — 3077F SYST BP >= 140 MM HG: CPT | Performed by: FAMILY MEDICINE

## 2024-05-07 PROCEDURE — 1126F AMNT PAIN NOTED NONE PRSNT: CPT | Performed by: FAMILY MEDICINE

## 2024-05-07 PROCEDURE — 36415 COLL VENOUS BLD VENIPUNCTURE: CPT | Performed by: FAMILY MEDICINE

## 2024-05-07 PROCEDURE — 99214 OFFICE O/P EST MOD 30 MIN: CPT | Performed by: FAMILY MEDICINE

## 2024-05-07 PROCEDURE — 3080F DIAST BP >= 90 MM HG: CPT | Performed by: FAMILY MEDICINE

## 2024-05-07 NOTE — PROGRESS NOTES
Date: 2024   Patient Name: Rajani Valentine  : 1965   MRN: [id number]     Chief Complaint:    Chief Complaint   Patient presents with    Anxiety     Medication refill    Bell's Palsy       History of Present Illness: Rajani Valentine is a 58 y.o. female who is here today to follow up for anxiety, fatigue, pre-diabetes.         Review of Systems:   Review of Systems   Constitutional:  Positive for activity change and fatigue. Negative for diaphoresis and fever.   Respiratory:  Negative for cough and shortness of breath.    Cardiovascular:  Negative for chest pain.   Gastrointestinal:  Negative for abdominal pain.   Musculoskeletal:  Negative for arthralgias.   Neurological:  Positive for headache. Negative for dizziness.   Psychiatric/Behavioral:  Positive for sleep disturbance. Negative for depressed mood. The patient is not nervous/anxious.        Past Medical History:   Past Medical History:   Diagnosis Date    Anxiety     Bell's palsy     Depression     Esophageal reflux     Hypertension     Irritable bowel syndrome     Pregnancy     3       Past Surgical History:   Past Surgical History:   Procedure Laterality Date    ANKLE ARTHROPLASTY Left 2009    ANKLE SURGERY      CHOLECYSTECTOMY      CHOLECYSTECTOMY Bilateral     COLPOSCOPY W/ BIOPSY / CURETTAGE      [facility]    ENDOMETRIAL ABLATION      TUBAL ABDOMINAL LIGATION         Family History:   Family History   Problem Relation Age of Onset    Hypertension Mother     Alzheimer's disease Mother     Allergies Mother     Skin cancer Father     Heart disease Father     Allergies Sister     Hypertension Brother     No Known Problems Maternal Aunt     No Known Problems Maternal Uncle     No Known Problems Paternal Aunt     Skin cancer Paternal Uncle     Colon cancer Maternal Grandmother     Dementia Maternal Grandfather     Heart disease Paternal Grandmother     No Known Problems Paternal Grandfather     Alzheimer's  disease Other        Social History:   Social History     Socioeconomic History    Marital status: Significant Other    Number of children: 3   Tobacco Use    Smoking status: Former     Current packs/day: 0.00     Average packs/day: 0.5 packs/day for 37.0 years (18.5 ttl pk-yrs)     Types: Cigarettes     Start date:      Quit date:      Years since quittin.4     Passive exposure: Past    Smokeless tobacco: Never    Tobacco comments:     quit 2 years ago   Vaping Use    Vaping status: Never Used   Substance and Sexual Activity    Alcohol use: Yes     Comment: socially    Drug use: No    Sexual activity: Yes     Partners: Male     Birth control/protection: Surgical     Comment: BTL        Medications:     Current Outpatient Medications:     amLODIPine (NORVASC) 10 MG tablet, Take 1 tablet by mouth Daily., Disp: 90 tablet, Rfl: 1    busPIRone (BUSPAR) 7.5 MG tablet, TAKE 1 TABLET BY MOUTH TWICE A DAY, Disp: 60 tablet, Rfl: 2    chlorthalidone (HYGROTEN) 50 MG tablet, Take 1 tablet by mouth Daily., Disp: 90 tablet, Rfl: 1    cloNIDine (Catapres) 0.1 MG tablet, Take 1 tablet by mouth 2 (Two) Times a Day As Needed for High Blood Pressure (Blood pressure greater than 160/100)., Disp: 30 tablet, Rfl: 5    hydrALAZINE (APRESOLINE) 25 MG tablet, Take 1 tablet by mouth 3 (Three) Times a Day., Disp: 90 tablet, Rfl: 3    hydrOXYzine (ATARAX) 25 MG tablet, TAKE ONE TABLET BY MOUTH EVERY 8 HOURS AS NEEDED FOR ANXIETY, Disp: 60 tablet, Rfl: 2    losartan (COZAAR) 100 MG tablet, TAKE ONE TABLET BY MOUTH DAILY, Disp: 90 tablet, Rfl: 1    meloxicam (MOBIC) 15 MG tablet, TAKE ONE TABLET BY MOUTH DAILY, Disp: 90 tablet, Rfl: 1    metoprolol succinate XL (TOPROL-XL) 200 MG 24 hr tablet, TAKE ONE TABLET BY MOUTH DAILY, Disp: 90 tablet, Rfl: 1    omeprazole (priLOSEC) 20 MG capsule, Take 1 capsule by mouth Daily., Disp: , Rfl:     pravastatin (PRAVACHOL) 20 MG tablet, Take 1 tablet by mouth Every Night., Disp: 30 tablet, Rfl:  "2    sertraline (ZOLOFT) 100 MG tablet, Take 1.5 tablets by mouth Daily., Disp: 135 tablet, Rfl: 1    traZODone (DESYREL) 50 MG tablet, TAKE ONE TABLET BY MOUTH EVERY NIGHT AT BEDTIME, Disp: 90 tablet, Rfl: 1    Allergies:   Allergies   Allergen Reactions    Crestor [Rosuvastatin] Myalgia    Diethylpropion Hcl Unknown - Low Severity    Gatifloxacin Unknown - High Severity    Lipitor [Atorvastatin] Myalgia    Valacyclovir Unknown - Low Severity       PHQ-2 Total Score: 0   PHQ-9 Total Score: 0     Physical Exam:  Vital Signs:   Vitals:    05/07/24 0949   BP: 146/90   Pulse: 60   Temp: 97.9 °F (36.6 °C)   TempSrc: Infrared   SpO2: 99%   Weight: 76.2 kg (168 lb)   Height: 160 cm (62.99\")   PainSc: 0-No pain     Body mass index is 29.77 kg/m².     Physical Exam  Vitals and nursing note reviewed.   Constitutional:       Appearance: Normal appearance.   Cardiovascular:      Rate and Rhythm: Normal rate and regular rhythm.      Heart sounds: Normal heart sounds.   Pulmonary:      Effort: Pulmonary effort is normal.      Breath sounds: Normal breath sounds.   Abdominal:      General: Bowel sounds are normal.      Palpations: Abdomen is soft.   Neurological:      Mental Status: She is alert and oriented to person, place, and time. Mental status is at baseline.   Psychiatric:         Mood and Affect: Mood normal.         Behavior: Behavior normal.           Assessment/Plan:   Diagnoses and all orders for this visit:    1. Fatigue, unspecified type (Primary)  -     Comprehensive Metabolic Panel; Future  -     CBC Auto Differential; Future  -     TSH; Future  -     T4, Free; Future  -     Iron; Future  -     Folate; Future  -     Mononucleosis Test, Qual With Reflex; Future  -     Vitamin B12; Future  -     Comprehensive Metabolic Panel  -     CBC Auto Differential  -     TSH  -     T4, Free  -     Iron  -     Folate  -     Mononucleosis Test, Qual With Reflex  -     Vitamin B12    2. Hyperlipidemia LDL goal <100  -     Lipid " Panel; Future  -     Lipid Panel    3. Prediabetes  -     Hemoglobin A1c; Future  -     Hemoglobin A1c    4. Overweight  Assessment & Plan:  Patient's (Body mass index is 29.77 kg/m².) indicates that they are overweight with health conditions that include hypertension and dyslipidemias . Weight is unchanged. BMI is is above average; BMI management plan is completed. We discussed portion control and increasing exercise.              Follow Up:   No follow-ups on file.    Nisha Vegas,   [facility]

## 2024-05-08 LAB
ALBUMIN SERPL-MCNC: 4.7 G/DL (ref 3.8–4.9)
ALBUMIN/GLOB SERPL: 1.9 {RATIO} (ref 1.2–2.2)
ALP SERPL-CCNC: 137 IU/L (ref 44–121)
ALT SERPL-CCNC: 13 IU/L (ref 0–32)
AST SERPL-CCNC: 17 IU/L (ref 0–40)
BASOPHILS # BLD AUTO: 0 X10E3/UL (ref 0–0.2)
BASOPHILS NFR BLD AUTO: 0 %
BILIRUB SERPL-MCNC: 0.6 MG/DL (ref 0–1.2)
BUN SERPL-MCNC: 14 MG/DL (ref 6–24)
BUN/CREAT SERPL: 13 (ref 9–23)
CALCIUM SERPL-MCNC: 9.9 MG/DL (ref 8.7–10.2)
CHLORIDE SERPL-SCNC: 105 MMOL/L (ref 96–106)
CHOLEST SERPL-MCNC: 181 MG/DL (ref 100–199)
CO2 SERPL-SCNC: 23 MMOL/L (ref 20–29)
CREAT SERPL-MCNC: 1.06 MG/DL (ref 0.57–1)
EGFRCR SERPLBLD CKD-EPI 2021: 61 ML/MIN/1.73
EOSINOPHIL # BLD AUTO: 0.1 X10E3/UL (ref 0–0.4)
EOSINOPHIL NFR BLD AUTO: 1 %
ERYTHROCYTE [DISTWIDTH] IN BLOOD BY AUTOMATED COUNT: 13.3 % (ref 11.7–15.4)
FOLATE SERPL-MCNC: 5.3 NG/ML
GLOBULIN SER CALC-MCNC: 2.5 G/DL (ref 1.5–4.5)
GLUCOSE SERPL-MCNC: 99 MG/DL (ref 70–99)
HBA1C MFR BLD: 5.8 % (ref 4.8–5.6)
HCT VFR BLD AUTO: 43.3 % (ref 34–46.6)
HDLC SERPL-MCNC: 51 MG/DL
HETEROPH AB SER QL LA: NEGATIVE
HGB BLD-MCNC: 14 G/DL (ref 11.1–15.9)
IMM GRANULOCYTES # BLD AUTO: 0 X10E3/UL (ref 0–0.1)
IMM GRANULOCYTES NFR BLD AUTO: 0 %
IRON SERPL-MCNC: 55 UG/DL (ref 27–159)
LDLC SERPL CALC-MCNC: 111 MG/DL (ref 0–99)
LYMPHOCYTES # BLD AUTO: 1.4 X10E3/UL (ref 0.7–3.1)
LYMPHOCYTES NFR BLD AUTO: 25 %
MCH RBC QN AUTO: 28.6 PG (ref 26.6–33)
MCHC RBC AUTO-ENTMCNC: 32.3 G/DL (ref 31.5–35.7)
MCV RBC AUTO: 89 FL (ref 79–97)
MONOCYTES # BLD AUTO: 0.5 X10E3/UL (ref 0.1–0.9)
MONOCYTES NFR BLD AUTO: 8 %
NEUTROPHILS # BLD AUTO: 3.8 X10E3/UL (ref 1.4–7)
NEUTROPHILS NFR BLD AUTO: 66 %
PLATELET # BLD AUTO: 205 X10E3/UL (ref 150–450)
POTASSIUM SERPL-SCNC: 3.9 MMOL/L (ref 3.5–5.2)
PROT SERPL-MCNC: 7.2 G/DL (ref 6–8.5)
RBC # BLD AUTO: 4.89 X10E6/UL (ref 3.77–5.28)
SODIUM SERPL-SCNC: 142 MMOL/L (ref 134–144)
T4 FREE SERPL-MCNC: 1.14 NG/DL (ref 0.82–1.77)
TRIGL SERPL-MCNC: 105 MG/DL (ref 0–149)
TSH SERPL DL<=0.005 MIU/L-ACNC: 1.73 UIU/ML (ref 0.45–4.5)
VIT B12 SERPL-MCNC: 534 PG/ML (ref 232–1245)
VLDLC SERPL CALC-MCNC: 19 MG/DL (ref 5–40)
WBC # BLD AUTO: 5.8 X10E3/UL (ref 3.4–10.8)

## 2024-05-13 ENCOUNTER — TELEPHONE (OUTPATIENT)
Dept: CARDIOLOGY | Facility: CLINIC | Age: 59
End: 2024-05-13
Payer: MEDICAID

## 2024-05-28 NOTE — ASSESSMENT & PLAN NOTE
Patient's (Body mass index is 29.77 kg/m².) indicates that they are overweight with health conditions that include hypertension and dyslipidemias . Weight is unchanged. BMI is is above average; BMI management plan is completed. We discussed portion control and increasing exercise.

## 2024-05-29 ENCOUNTER — TELEPHONE (OUTPATIENT)
Dept: FAMILY MEDICINE CLINIC | Facility: CLINIC | Age: 59
End: 2024-05-29

## 2024-05-29 NOTE — TELEPHONE ENCOUNTER
Caller: Rajani Valentine    Relationship: Self    Best call back number: [phone]     What is the medical concern/diagnosis: SKIN CONCERNS / MOLES     What specialty or service is being requested: DERMATOLOGY     What is the provider, practice or medical service name:     What is the office location: [place] OR [place]    Any additional details: PATIENT CALLED REQUESTING A REFERRAL TO A PROVIDER/FACILITY THAT TAKES HER INSURANCE. PLEASE ADVISE

## 2024-06-14 ENCOUNTER — OFFICE VISIT (OUTPATIENT)
Dept: OBSTETRICS AND GYNECOLOGY | Age: 59
End: 2024-06-14
Payer: MEDICAID

## 2024-06-14 VITALS
HEIGHT: 62 IN | SYSTOLIC BLOOD PRESSURE: 150 MMHG | BODY MASS INDEX: 31.1 KG/M2 | WEIGHT: 169 LBS | DIASTOLIC BLOOD PRESSURE: 94 MMHG

## 2024-06-14 DIAGNOSIS — Z01.419 WELL FEMALE EXAM WITH ROUTINE GYNECOLOGICAL EXAM: Primary | ICD-10-CM

## 2024-06-14 DIAGNOSIS — Z12.4 SCREENING FOR MALIGNANT NEOPLASM OF CERVIX: ICD-10-CM

## 2024-06-14 DIAGNOSIS — Z11.51 SCREENING FOR HUMAN PAPILLOMAVIRUS (HPV): ICD-10-CM

## 2024-06-14 DIAGNOSIS — Z12.31 SCREENING MAMMOGRAM FOR BREAST CANCER: ICD-10-CM

## 2024-06-14 DIAGNOSIS — I10 PRIMARY HYPERTENSION: ICD-10-CM

## 2024-06-14 DIAGNOSIS — Z11.3 SCREEN FOR STD (SEXUALLY TRANSMITTED DISEASE): ICD-10-CM

## 2024-06-14 DIAGNOSIS — Z13.89 SCREENING FOR BLOOD OR PROTEIN IN URINE: ICD-10-CM

## 2024-06-14 PROBLEM — B97.7 HPV IN FEMALE: Status: RESOLVED | Noted: 2021-06-22 | Resolved: 2024-06-14

## 2024-06-14 PROBLEM — H53.9 VISION CHANGES: Status: RESOLVED | Noted: 2023-04-19 | Resolved: 2024-06-14

## 2024-06-14 PROBLEM — R00.0 TACHYCARDIA: Status: RESOLVED | Noted: 2022-05-31 | Resolved: 2024-06-14

## 2024-06-14 PROBLEM — K59.00 CONSTIPATION: Status: RESOLVED | Noted: 2022-04-18 | Resolved: 2024-06-14

## 2024-06-14 PROBLEM — R94.31 ABNORMAL FINDING ON EKG: Status: RESOLVED | Noted: 2023-05-18 | Resolved: 2024-06-14

## 2024-06-14 PROBLEM — G51.0 BELL'S PALSY: Status: RESOLVED | Noted: 2022-09-27 | Resolved: 2024-06-14

## 2024-06-14 PROBLEM — E66.3 OVERWEIGHT: Status: RESOLVED | Noted: 2024-05-07 | Resolved: 2024-06-14

## 2024-06-14 PROBLEM — R53.83 FATIGUE: Status: RESOLVED | Noted: 2024-05-07 | Resolved: 2024-06-14

## 2024-06-14 PROBLEM — R40.0 DAYTIME SOMNOLENCE: Status: RESOLVED | Noted: 2022-10-21 | Resolved: 2024-06-14

## 2024-06-14 PROBLEM — H61.21 IMPACTED CERUMEN OF RIGHT EAR: Status: RESOLVED | Noted: 2022-05-31 | Resolved: 2024-06-14

## 2024-06-14 PROBLEM — G51.0 BELL'S PALSY: Status: ACTIVE | Noted: 2022-09-01

## 2024-06-14 PROBLEM — R00.2 PALPITATIONS: Status: RESOLVED | Noted: 2023-07-17 | Resolved: 2024-06-14

## 2024-06-14 PROBLEM — R06.02 SHORTNESS OF BREATH: Status: RESOLVED | Noted: 2023-05-18 | Resolved: 2024-06-14

## 2024-06-14 LAB
BILIRUB BLD-MCNC: NEGATIVE MG/DL
CLARITY, POC: CLEAR
COLOR UR: YELLOW
GLUCOSE UR STRIP-MCNC: NEGATIVE MG/DL
KETONES UR QL: NEGATIVE
LEUKOCYTE EST, POC: ABNORMAL
NITRITE UR-MCNC: NEGATIVE MG/ML
PH UR: 7 [PH] (ref 5–8)
PROT UR STRIP-MCNC: NEGATIVE MG/DL
RBC # UR STRIP: NEGATIVE /UL
SP GR UR: 1.01 (ref 1–1.03)
UROBILINOGEN UR QL: ABNORMAL

## 2024-06-14 NOTE — PROGRESS NOTES
[facility]   Obstetrics and Gynecology     2024    Patient: Rajani Valentine          MR#:[id number]    History of Present Illness    Chief Complaint   Patient presents with    Gynecologic Exam     CC: Annual, last pap 22 neg, HPV neg, last mammo 23 neg, re check /90       58 y.o. female  who presents for annual exam.    Patient presents for her routine annual exam feeling well without complaints.  Blood pressures out of target range and the patient reports is the typical problem managed by her primary care.  Suggested primary care follow-up        Relevant data reviewed:    Patient's last menstrual period was 10/16/2015 (exact date).  Obstetric History:  OB History          3    Para   3    Term   3            AB        Living   3         SAB        IAB        Ectopic        Molar        Multiple        Live Births   3               Menstrual History:     Patient's last menstrual period was 10/16/2015 (exact date).       Social History     Substance and Sexual Activity   Sexual Activity Yes    Partners: Male    Birth control/protection: Surgical    Comment: BTL      ______________________________________  Patient Active Problem List   Diagnosis    Gastroesophageal reflux disease    Primary hypertension    LGSIL on Pap smear of cervix    DANETTE (generalized anxiety disorder)    Moderate episode of recurrent major depressive disorder    Hyperlipidemia LDL goal <100    Prediabetes    Tobacco use    Bell's palsy     Past Medical History:   Diagnosis Date    Anxiety     Bell's palsy 2022    Depression     Esophageal reflux     Hypertension     Irritable bowel syndrome      Past Surgical History:   Procedure Laterality Date    ANKLE ARTHROPLASTY Left 2009    ANKLE SURGERY      CHOLECYSTECTOMY      CHOLECYSTECTOMY Bilateral     COLPOSCOPY W/ BIOPSY / CURETTAGE      [facility]    ENDOMETRIAL ABLATION      TUBAL ABDOMINAL LIGATION        Social History     Tobacco Use   Smoking Status Former    Current packs/day: 0.00    Average packs/day: 0.5 packs/day for 37.0 years (18.5 ttl pk-yrs)    Types: Cigarettes    Start date:     Quit date:     Years since quittin.4    Passive exposure: Past   Smokeless Tobacco Never   Tobacco Comments    quit 2 years ago     Family History   Problem Relation Age of Onset    Skin cancer Father     Heart disease Father     Hypertension Mother     Alzheimer's disease Mother     Allergies Mother     Hypertension Brother     Allergies Sister     No Known Problems Paternal Grandfather     Heart disease Paternal Grandmother     Colon cancer Maternal Grandmother     Dementia Maternal Grandfather     No Known Problems Maternal Aunt     No Known Problems Maternal Uncle     No Known Problems Paternal Aunt     Skin cancer Paternal Uncle     Alzheimer's disease Other     Breast cancer Neg Hx     Ovarian cancer Neg Hx     Uterine cancer Neg Hx      Prior to Admission medications    Medication Sig Start Date End Date Taking? Authorizing Provider   amLODIPine (NORVASC) 10 MG tablet Take 1 tablet by mouth Daily. 23  Yes Nisha Vegas DO   busPIRone (BUSPAR) 7.5 MG tablet TAKE 1 TABLET BY MOUTH TWICE A DAY 24  Yes Nisha Vegas DO   chlorthalidone (HYGROTEN) 50 MG tablet Take 1 tablet by mouth Daily. 10/21/22  Yes Nisha Vegas DO   cloNIDine (Catapres) 0.1 MG tablet Take 1 tablet by mouth 2 (Two) Times a Day As Needed for High Blood Pressure (Blood pressure greater than 160/100). 22  Yes Nisha Vegas DO   hydrALAZINE (APRESOLINE) 25 MG tablet Take 1 tablet by mouth 3 (Three) Times a Day. 23  Yes Nichelle Bustos APRN   hydrOXYzine (ATARAX) 25 MG tablet TAKE ONE TABLET BY MOUTH EVERY 8 HOURS AS NEEDED FOR ANXIETY 24  Yes Nisha Vegas DO   losartan (COZAAR) 100 MG tablet TAKE ONE TABLET BY MOUTH DAILY 23  Yes  "Nisha Vegas DO   meloxicam (MOBIC) 15 MG tablet TAKE ONE TABLET BY MOUTH DAILY 4/17/23  Yes Nisha Vegas DO   metoprolol succinate XL (TOPROL-XL) 200 MG 24 hr tablet TAKE ONE TABLET BY MOUTH DAILY 12/7/23  Yes Nisha Vegas DO   omeprazole (priLOSEC) 20 MG capsule Take 1 capsule by mouth Daily.   Yes Provider, MD Malena   pravastatin (PRAVACHOL) 20 MG tablet Take 1 tablet by mouth Every Night. 7/12/23  Yes Nichelle Bustos APRN   sertraline (ZOLOFT) 100 MG tablet Take 1.5 tablets by mouth Daily. 5/22/23  Yes Nisha Vegas DO   traZODone (DESYREL) 50 MG tablet TAKE ONE TABLET BY MOUTH EVERY NIGHT AT BEDTIME 8/14/23  Yes Nisha Vegas DO     _______________________________________    Current contraception: post menopausal status  History of abnormal Pap smear: no  Family history of uterine or ovarian cancer: no  Family History of colon cancer/colon polyps: no  History of abnormal mammogram: no  History of abnormal lipids: yes - on a statin     The following portions of the patient's history were reviewed and updated as appropriate: allergies, current medications, past family history, past medical history, past social history, past surgical history, and problem list.    Review of Systems    Pertinent items are noted in HPI.       Objective   Physical Exam    /94   Ht 157.5 cm (62\")   Wt 76.7 kg (169 lb)   LMP 10/16/2015 (Exact Date)   BMI 30.91 kg/m²    BP Readings from Last 3 Encounters:   06/14/24 150/94   05/07/24 146/90   07/17/23 141/82      Wt Readings from Last 3 Encounters:   06/14/24 76.7 kg (169 lb)   05/07/24 76.2 kg (168 lb)   07/17/23 75.8 kg (167 lb)        BMI: Estimated body mass index is 30.91 kg/m² as calculated from the following:    Height as of this encounter: 157.5 cm (62\").    Weight as of this encounter: 76.7 kg (169 lb).       General: alert, appears stated age, and cooperative   Heart: regular " rate and rhythm, S1, S2 normal, no murmur, click, rub or gallop   Lungs: clear to auscultation bilaterally   Abdomen: soft, non-tender, without masses, no organomegaly   Breast: inspection negative, no nipple discharge or bleeding, no masses or nodularity palpable   External genitalia/Vulva: External genitalia including bartholin's glands, Urethra, [place]'s gland and urethra meatus are normal, Perineum, rectum and anus appear normal , and Bladder appears normal without significant prolapse    Vagina: normal mucosa, normal discharge   Cervix: no lesions   Uterus: normal size and non-tender   Adnexa: normal adnexa     As part of wellness and prevention, the following topics were discussed with the patient:  Encouraged self breast exam  Physical activity and regular exercised encouraged.         Problem List   Meds  History  Prep for Surg   Imagin}    Assessment:  Diagnoses and all orders for this visit:    1. Well female exam with routine gynecological exam (Primary)  -     IGP, Apt HPV,rfx 16 / 18,45    2. Screening for human papillomavirus (HPV)  -     IGP, Apt HPV,rfx 16 / 18,45    3. Screening for malignant neoplasm of cervix  -     IGP, Apt HPV,rfx 16 / 18,45    4. Screening mammogram for breast cancer  -     Mammo Screening Digital Tomosynthesis Bilateral With CAD; Future    5. Screening for blood or protein in urine  -     POC Urinalysis Dipstick    6. Screen for STD (sexually transmitted disease)  -     Cancel: NuSwab VG+ - Swab, Vagina    7. Primary hypertension      Plan:  Return in 1 year (on 2025) for Annual exam.    Dereje Wallace MD  2024 10:45 EDT

## 2024-06-19 LAB
CYTOLOGIST CVX/VAG CYTO: ABNORMAL
CYTOLOGY CVX/VAG DOC CYTO: ABNORMAL
CYTOLOGY CVX/VAG DOC THIN PREP: ABNORMAL
DX ICD CODE: ABNORMAL
HPV I/H RISK 4 DNA CVX QL PROBE+SIG AMP: POSITIVE
HPV16 DNA CVX QL PROBE+SIG AMP: NEGATIVE
HPV18+45 E6+E7 MRNA CVX QL NAA+PROBE: NEGATIVE
Lab: ABNORMAL
OTHER STN SPEC: ABNORMAL
STAT OF ADQ CVX/VAG CYTO-IMP: ABNORMAL

## 2024-06-27 ENCOUNTER — TELEPHONE (OUTPATIENT)
Dept: FAMILY MEDICINE CLINIC | Facility: CLINIC | Age: 59
End: 2024-06-27
Payer: MEDICAID

## 2024-06-27 NOTE — TELEPHONE ENCOUNTER
Caller: Rajani Valentine    Relationship to patient: Self    Best call back number:      Chief complaint: PAIN ON KNUCKLE OF RIGHT PINKIE, ITCHY, PAINFUL    Type of visit: OFFICE VISIT    Requested date: TOMORROW MORNING OR ANY DAY NEXT WEEK     If rescheduling, when is the original appointment: NA     Additional notes:PATIENT DECLINED EXTENDERS

## 2024-06-28 NOTE — TELEPHONE ENCOUNTER
GOT PATIENT SCHEDULE WITH DR. DEVRIES ON JULY 10TH. DID OFFER SATURDAY CLINIC BUT PT REFUSED, WANTED TO GET SCHEDULED FOR ANOTHER DAY

## 2024-07-10 ENCOUNTER — OFFICE VISIT (OUTPATIENT)
Dept: FAMILY MEDICINE CLINIC | Facility: CLINIC | Age: 59
End: 2024-07-10
Payer: MEDICAID

## 2024-07-10 VITALS
WEIGHT: 174.5 LBS | SYSTOLIC BLOOD PRESSURE: 154 MMHG | RESPIRATION RATE: 12 BRPM | OXYGEN SATURATION: 94 % | DIASTOLIC BLOOD PRESSURE: 90 MMHG | BODY MASS INDEX: 32.11 KG/M2 | HEART RATE: 58 BPM | HEIGHT: 62 IN

## 2024-07-10 DIAGNOSIS — R21 RASH OF HAND: Primary | ICD-10-CM

## 2024-07-10 DIAGNOSIS — M79.641 PAIN OF RIGHT HAND: ICD-10-CM

## 2024-07-10 PROCEDURE — 1160F RVW MEDS BY RX/DR IN RCRD: CPT | Performed by: FAMILY MEDICINE

## 2024-07-10 PROCEDURE — 99214 OFFICE O/P EST MOD 30 MIN: CPT | Performed by: FAMILY MEDICINE

## 2024-07-10 PROCEDURE — 1159F MED LIST DOCD IN RCRD: CPT | Performed by: FAMILY MEDICINE

## 2024-07-10 PROCEDURE — 3077F SYST BP >= 140 MM HG: CPT | Performed by: FAMILY MEDICINE

## 2024-07-10 PROCEDURE — 3080F DIAST BP >= 90 MM HG: CPT | Performed by: FAMILY MEDICINE

## 2024-07-10 PROCEDURE — 1126F AMNT PAIN NOTED NONE PRSNT: CPT | Performed by: FAMILY MEDICINE

## 2024-07-10 RX ORDER — CEPHALEXIN 500 MG/1
500 CAPSULE ORAL 3 TIMES DAILY
Qty: 21 CAPSULE | Refills: 0 | Status: SHIPPED | OUTPATIENT
Start: 2024-07-10

## 2024-07-10 RX ORDER — CLOTRIMAZOLE 1 %
1 CREAM (GRAM) TOPICAL 2 TIMES DAILY
Qty: 45 G | Refills: 0 | Status: SHIPPED | OUTPATIENT
Start: 2024-07-10

## 2024-07-10 NOTE — PROGRESS NOTES
Follow Up Office Visit      Patient Name: Rajani Valentine  : 1965   MRN: [id number]     Chief Complaint:    Chief Complaint   Patient presents with    knuckle pain     Pt has bump on right pinky knuckle. C/O pain,burning sensation.Bump is flaky and red. States she has had this for 4-6 weeks. Continues to get more painful.        History of Present Illness: Rajani Valentine is a 58 y.o. female who is here today for evaluation of a tender right her right MCP joint.  She states the rash has been present for about 4 to 6 weeks used to get more painful and sore.  She states there was no injury or known foreign body.    Subjective      Review of Systems:   Review of Systems   Musculoskeletal:  Positive for arthralgias.   Skin:  Positive for rash.       The following portions of the patient's history were reviewed and updated as appropriate: allergies, current medications, past family history, past medical history, past social history, past surgical history and problem list.    Medications:     Current Outpatient Medications:     amLODIPine (NORVASC) 10 MG tablet, Take 1 tablet by mouth Daily., Disp: 90 tablet, Rfl: 1    busPIRone (BUSPAR) 7.5 MG tablet, TAKE 1 TABLET BY MOUTH TWICE A DAY, Disp: 60 tablet, Rfl: 2    chlorthalidone (HYGROTEN) 50 MG tablet, Take 1 tablet by mouth Daily., Disp: 90 tablet, Rfl: 1    cloNIDine (Catapres) 0.1 MG tablet, Take 1 tablet by mouth 2 (Two) Times a Day As Needed for High Blood Pressure (Blood pressure greater than 160/100)., Disp: 30 tablet, Rfl: 5    hydrALAZINE (APRESOLINE) 25 MG tablet, Take 1 tablet by mouth 3 (Three) Times a Day., Disp: 90 tablet, Rfl: 3    hydrOXYzine (ATARAX) 25 MG tablet, TAKE ONE TABLET BY MOUTH EVERY 8 HOURS AS NEEDED FOR ANXIETY, Disp: 60 tablet, Rfl: 2    losartan (COZAAR) 100 MG tablet, TAKE ONE TABLET BY MOUTH DAILY, Disp: 90 tablet, Rfl: 1    meloxicam (MOBIC) 15 MG tablet, TAKE ONE TABLET BY MOUTH DAILY, Disp: 90 tablet, Rfl: 1     "metoprolol succinate XL (TOPROL-XL) 200 MG 24 hr tablet, TAKE ONE TABLET BY MOUTH DAILY, Disp: 90 tablet, Rfl: 1    omeprazole (priLOSEC) 20 MG capsule, Take 1 capsule by mouth Daily., Disp: , Rfl:     pravastatin (PRAVACHOL) 20 MG tablet, Take 1 tablet by mouth Every Night., Disp: 30 tablet, Rfl: 2    sertraline (ZOLOFT) 100 MG tablet, Take 1.5 tablets by mouth Daily., Disp: 135 tablet, Rfl: 1    traZODone (DESYREL) 50 MG tablet, TAKE ONE TABLET BY MOUTH EVERY NIGHT AT BEDTIME, Disp: 90 tablet, Rfl: 1    cephalexin (KEFLEX) 500 MG capsule, Take 1 capsule by mouth 3 (Three) Times a Day., Disp: 21 capsule, Rfl: 0    clotrimazole (LOTRIMIN) 1 % cream, Apply 1 Application topically to the appropriate area as directed 2 (Two) Times a Day., Disp: 45 g, Rfl: 0    mupirocin (BACTROBAN) 2 % ointment, Apply 1 Application topically to the appropriate area as directed 3 (Three) Times a Day., Disp: 1 each, Rfl: 0    Allergies:   Allergies   Allergen Reactions    Crestor [Rosuvastatin] Myalgia    Diethylpropion Hcl Unknown - Low Severity    Gatifloxacin Unknown - High Severity    Lipitor [Atorvastatin] Myalgia    Valacyclovir Unknown - Low Severity       Objective     Physical Exam:  Vital Signs:   Vitals:    07/10/24 1133   BP: 154/90   BP Location: Left arm   Patient Position: Sitting   Cuff Size: Adult   Pulse: 58   Resp: 12   SpO2: 94%   Weight: 79.2 kg (174 lb 8 oz)   Height: 157.5 cm (62\")     Body mass index is 31.92 kg/m².   Facility age limit for growth %frank is 20 years.    Physical Exam  Vitals and nursing note reviewed.   Constitutional:       General: She is not in acute distress.     Appearance: Normal appearance. She is not ill-appearing or toxic-appearing.   Musculoskeletal:      Right hand: Tenderness present. No swelling.        Hands:       Comments: Well-defined, erythematous rash with some scaling   Neurological:      Mental Status: She is alert.         Procedures    PHQ-9 Total Score:       Assessment / " Plan      Assessment/Plan:   Assessment & Plan  Rash of hand    Pain of right hand      Orders Placed This Encounter   Procedures    XR Hand 3+ View Right     New Medications Ordered This Visit   Medications    cephalexin (KEFLEX) 500 MG capsule     Sig: Take 1 capsule by mouth 3 (Three) Times a Day.     Dispense:  21 capsule     Refill:  0    mupirocin (BACTROBAN) 2 % ointment     Sig: Apply 1 Application topically to the appropriate area as directed 3 (Three) Times a Day.     Dispense:  1 each     Refill:  0    clotrimazole (LOTRIMIN) 1 % cream     Sig: Apply 1 Application topically to the appropriate area as directed 2 (Two) Times a Day.     Dispense:  45 g     Refill:  0        Etiology not clear at this time.  Clinically looks fungal but history is not consistent with a fungal infection.  Will cover for bacterial and fungal etiologies.  Will x-ray today.  Consider dermatology evaluation if symptoms persist.  Keep the area covered.           Follow Up:   Return if symptoms worsen or fail to improve.      TRACY Keenan MD  [facility]

## 2024-09-03 LAB
NCCN CRITERIA FLAG: NORMAL
TYRER CUZICK SCORE: 6.6

## 2024-09-11 DIAGNOSIS — I10 PRIMARY HYPERTENSION: ICD-10-CM

## 2024-09-11 RX ORDER — LOSARTAN POTASSIUM 100 MG/1
100 TABLET ORAL DAILY
Qty: 90 TABLET | Refills: 1 | Status: SHIPPED | OUTPATIENT
Start: 2024-09-11

## 2024-09-11 RX ORDER — METOPROLOL SUCCINATE 200 MG/1
200 TABLET, EXTENDED RELEASE ORAL DAILY
Qty: 90 TABLET | Refills: 1 | Status: SHIPPED | OUTPATIENT
Start: 2024-09-11

## 2024-09-11 RX ORDER — MELOXICAM 15 MG/1
TABLET ORAL
Qty: 90 TABLET | Refills: 1 | Status: SHIPPED | OUTPATIENT
Start: 2024-09-11

## 2024-10-02 DIAGNOSIS — F33.1 MODERATE EPISODE OF RECURRENT MAJOR DEPRESSIVE DISORDER: ICD-10-CM

## 2024-10-02 RX ORDER — SERTRALINE HYDROCHLORIDE 100 MG/1
150 TABLET, FILM COATED ORAL DAILY
Qty: 135 TABLET | Refills: 1 | Status: SHIPPED | OUTPATIENT
Start: 2024-10-02

## 2024-10-02 RX ORDER — TRAZODONE HYDROCHLORIDE 50 MG/1
TABLET, FILM COATED ORAL
Qty: 90 TABLET | Refills: 1 | Status: SHIPPED | OUTPATIENT
Start: 2024-10-02

## 2024-10-02 NOTE — TELEPHONE ENCOUNTER
Caller: Rajani Valentine    Relationship: Self    Best call back number: [phone]    Requested Prescriptions:   Requested Prescriptions     Pending Prescriptions Disp Refills    sertraline (ZOLOFT) 100 MG tablet 135 tablet 1     Sig: Take 1.5 tablets by mouth Daily.        Pharmacy where request should be sent: [facility] [id number] [address] AT [facility] [address] - [phone] [facility] [phone] FX     Last office visit with prescribing clinician: 5/7/2024   Last telemedicine visit with prescribing clinician: Visit date not found   Next office visit with prescribing clinician: Visit date not found     Does the patient have less than a 3 day supply:  [x] Yes  [] No    Would you like a call back once the refill request has been completed: [] Yes [x] No    If the office needs to give you a call back, can they leave a voicemail: [] Yes [x] No    Bronwyn Carlisle Rep   10/02/24 14:41 EDT

## 2024-12-16 ENCOUNTER — OFFICE VISIT (OUTPATIENT)
Dept: OBSTETRICS AND GYNECOLOGY | Age: 59
End: 2024-12-16
Payer: MEDICAID

## 2024-12-16 VITALS
SYSTOLIC BLOOD PRESSURE: 148 MMHG | WEIGHT: 184 LBS | HEIGHT: 62 IN | BODY MASS INDEX: 33.86 KG/M2 | DIASTOLIC BLOOD PRESSURE: 90 MMHG

## 2024-12-16 DIAGNOSIS — Z11.51 SCREENING FOR HUMAN PAPILLOMAVIRUS (HPV): ICD-10-CM

## 2024-12-16 DIAGNOSIS — L98.9 SKIN LESION: ICD-10-CM

## 2024-12-16 DIAGNOSIS — Z13.9 SPECIAL SCREENING: ICD-10-CM

## 2024-12-16 DIAGNOSIS — Z12.4 SCREENING FOR MALIGNANT NEOPLASM OF CERVIX: ICD-10-CM

## 2024-12-16 DIAGNOSIS — R87.618 ABNORMAL PAPANICOLAOU SMEAR OF CERVIX WITH POSITIVE HUMAN PAPILLOMA VIRUS (HPV) TEST: ICD-10-CM

## 2024-12-16 DIAGNOSIS — Z13.89 SCREENING FOR BLOOD OR PROTEIN IN URINE: Primary | ICD-10-CM

## 2024-12-16 LAB
B-HCG UR QL: NEGATIVE
BILIRUB BLD-MCNC: NEGATIVE MG/DL
CLARITY, POC: CLEAR
COLOR UR: YELLOW
EXPIRATION DATE: NORMAL
GLUCOSE UR STRIP-MCNC: NEGATIVE MG/DL
INTERNAL NEGATIVE CONTROL: NORMAL
INTERNAL POSITIVE CONTROL: NORMAL
KETONES UR QL: NEGATIVE
LEUKOCYTE EST, POC: NEGATIVE
Lab: NORMAL
NITRITE UR-MCNC: NEGATIVE MG/ML
PH UR: 6.5 [PH] (ref 5–8)
PROT UR STRIP-MCNC: NEGATIVE MG/DL
RBC # UR STRIP: ABNORMAL /UL
SP GR UR: 1.02 (ref 1–1.03)
UROBILINOGEN UR QL: NORMAL

## 2024-12-16 NOTE — PROGRESS NOTES
Procedures    [facility]   Obstetrics and Gynecology     12/16/2024    Patient: Rajani Valentine          MR#:[id number]      Colposcopy Procedure Note    Chief Complaint   Patient presents with    Procedure     Colpo, last pap 6/14/24 neg, HPV+, mammo 4/19/23       Indications: Pap smear 6 months ago showed: PAP negative with + High risk HPV .     Procedure Details   The risks and benefits of the procedure and Written informed consent obtained.    UCG: Negative    Speculum placed in vagina and excellent visualization of cervix achieved, cervix swabbed x 3 with acetic acid solution.    Findings:  Cervix: no visible lesions; SCJ visualized 360 degrees without lesions.  Vaginal inspection: vaginal colposcopy not performed.  Vulvar colposcopy: vulvar colposcopy not performed.    Physical Exam    Specimens: PAP    Impression:  Normal exam, no lesions    Complications: none.    The procedure was well tolerated by the patient without problems.    Plan:  Specimens labelled and sent to Pathology.          Dereje Wallace MD  12/16/2024  10:50 EST

## 2024-12-16 NOTE — PROGRESS NOTES
Patient with complaint of irregularly pigmented lesion on her inner thigh requesting excision.      Physical Exam  Constitutional:            Atypical appearing nevus with irregular pigmentation approximately 5 mm in greatest diameter.          Procedure Note     The area denoted is cleaned well with betadine and injected with 4 cc of 1% lidocaine with epinephrine.    Excised with 6 mm punch then closure with interrupted stitch of Vicryl    The procedure is well tolerated by the patient

## 2024-12-20 LAB
DX ICD CODE: NORMAL
DX ICD CODE: NORMAL
PATH REPORT.FINAL DX SPEC: NORMAL
PATH REPORT.GROSS SPEC: NORMAL
PATH REPORT.SITE OF ORIGIN SPEC: NORMAL
PATHOLOGIST NAME: NORMAL
PAYMENT PROCEDURE: NORMAL

## 2024-12-23 NOTE — PROGRESS NOTES
Call pt:  PAP is negative but hybrid capture + for HPV.    Suggest colposcopy and repeat PAP in 6 months

## 2025-02-04 ENCOUNTER — OFFICE VISIT (OUTPATIENT)
Dept: FAMILY MEDICINE CLINIC | Facility: CLINIC | Age: 60
End: 2025-02-04
Payer: MEDICAID

## 2025-02-04 VITALS
OXYGEN SATURATION: 98 % | BODY MASS INDEX: 33.49 KG/M2 | SYSTOLIC BLOOD PRESSURE: 178 MMHG | TEMPERATURE: 98.7 F | DIASTOLIC BLOOD PRESSURE: 112 MMHG | WEIGHT: 182 LBS | HEART RATE: 69 BPM | HEIGHT: 62 IN | RESPIRATION RATE: 18 BRPM

## 2025-02-04 DIAGNOSIS — J01.40 ACUTE NON-RECURRENT PANSINUSITIS: ICD-10-CM

## 2025-02-04 DIAGNOSIS — R05.9 COUGH, UNSPECIFIED TYPE: Primary | ICD-10-CM

## 2025-02-04 DIAGNOSIS — I10 PRIMARY HYPERTENSION: ICD-10-CM

## 2025-02-04 LAB
EXPIRATION DATE: NORMAL
FLUAV AG UPPER RESP QL IA.RAPID: NOT DETECTED
FLUBV AG UPPER RESP QL IA.RAPID: NOT DETECTED
INTERNAL CONTROL: NORMAL
Lab: NORMAL
SARS-COV-2 AG UPPER RESP QL IA.RAPID: NOT DETECTED

## 2025-02-04 PROCEDURE — 3077F SYST BP >= 140 MM HG: CPT

## 2025-02-04 PROCEDURE — 3080F DIAST BP >= 90 MM HG: CPT

## 2025-02-04 PROCEDURE — 1159F MED LIST DOCD IN RCRD: CPT

## 2025-02-04 PROCEDURE — 99213 OFFICE O/P EST LOW 20 MIN: CPT

## 2025-02-04 PROCEDURE — 1160F RVW MEDS BY RX/DR IN RCRD: CPT

## 2025-02-04 PROCEDURE — 1125F AMNT PAIN NOTED PAIN PRSNT: CPT

## 2025-02-04 PROCEDURE — 87428 SARSCOV & INF VIR A&B AG IA: CPT

## 2025-02-04 RX ORDER — METHYLPREDNISOLONE 4 MG/1
TABLET ORAL
Qty: 21 TABLET | Refills: 0 | Status: CANCELLED | OUTPATIENT
Start: 2025-02-04

## 2025-02-04 RX ORDER — CLONIDINE HYDROCHLORIDE 0.1 MG/1
0.1 TABLET ORAL 2 TIMES DAILY PRN
Qty: 30 TABLET | Refills: 5 | Status: SHIPPED | OUTPATIENT
Start: 2025-02-04

## 2025-02-04 NOTE — ASSESSMENT & PLAN NOTE
she is not ill-appearing.  She is not tachypneic or tachycardic.  She is not hypoxic.  She is afebrile.  She is hypertensive at 178/112.  She denies chest pain, shortness of breath, visual disturbances, headaches or lower extremity edema.  She states that she did take one of her clonidine pills after her blood pressure was taken.  She states that a recheck of her blood pressure would likely not be accurate at this time as her medication has not had time to fully kick in.  Will provide refill of her clonidine today.  Recommend that she monitor this closely at home.  Discussed that if her blood pressure remains greater than 140/90, she seek medical evaluation at ED.    Orders:    cloNIDine (Catapres) 0.1 MG tablet; Take 1 tablet by mouth 2 (Two) Times a Day As Needed for High Blood Pressure (Blood pressure greater than 160/100).

## 2025-02-04 NOTE — PROGRESS NOTES
Chief Complaint  Sinusitis (Intermittently for a month. Does home health, trades off getting sick with coworkers. Taking Tylenol but cannot take much else due to HTN), Cough (Chills & body aches. No fever. Nausea, no vomiting. No diarrhea during this bout of sickness, but did experience it the previous one), Headache (Bell's palsy exacerbates symptoms), Shortness of Breath (Productive cough, some wheezing in AM when getting up), and Med Refill (Refill on clonidine)    Subjective          Rajani Valentine presents to [facility]  Shortness of Breath  This is a new problem. The current episode started today. The problem occurs rarely. The problem has been coming and going. Associated symptoms include headaches, leg pain, a sore throat, sputum production and wheezing. Pertinent negatives include no abdominal pain, chest pain, fever, hemoptysis, orthopnea, PND, swollen glands, syncope or vomiting. Nothing aggravates the symptoms. Her past medical history is significant for asthma. There is no history of COPD. The maximum temperature recorded prior to her arrival was unmeasured.     Patient presents the office today with concerns for sinusitis.  She has had on and off sinus symptoms over the last month.  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  She is also experiencing a cough, chills, body aches.  She denies any fevers, nausea or vomiting.  Denies any diarrhea.  She does have a headache at times but feels that her Bell's palsy can exacerbate her symptoms more.  She has had a productive cough as well as some wheezing noted in the mornings when she gets up.  She has been taking Tylenol but she is unable to take much due to her history of hypertension.    She is hypertensive today at 178/112.  She is requesting a refill of her clonidine today.  She is currently on amlodipine 10 mg, chlorthalidone 50 mg daily, hydralazine 25 mg 3 times daily, losartan 100 mg  "daily, metoprolol succinate  mg daily.  She has clonidine 0.1 mg that she can utilize twice daily as needed for high blood pressure greater than 160/100. She did take her clonidine in office today. She denies any chest pain, shortness of breath, lower extremity edema, headaches or visual disturbances.       Objective   Vital Signs:   BP (!) 178/112 (BP Location: Left arm, Patient Position: Sitting, Cuff Size: Adult)   Pulse 69   Temp 98.7 °F (37.1 °C) (Oral)   Resp 18   Ht 157.5 cm (62\")   Wt 82.6 kg (182 lb)   SpO2 98%   BMI 33.29 kg/m²     Body mass index is 33.29 kg/m².    Review of Systems   Constitutional:  Positive for chills. Negative for fever.   HENT:  Positive for congestion, sinus pressure and sore throat. Negative for swollen glands.    Respiratory:  Positive for cough, sputum production, shortness of breath and wheezing. Negative for hemoptysis.    Cardiovascular:  Negative for chest pain, orthopnea, syncope and PND.   Gastrointestinal:  Negative for abdominal pain, diarrhea, nausea and vomiting.   Neurological:  Positive for headache.       Past History:  Medical History: has a past medical history of Abnormal Pap smear of cervix, Anxiety (2021), Bell's palsy (09/2022), Depression (2021), Endometriosis, Esophageal reflux, HPV (human papilloma virus) infection, Hyperlipidemia, Hypertension, Irritable bowel syndrome (2001), Multiple gestation, Ovarian cyst, Ulcerative colitis, and Varicella.   Surgical History: has a past surgical history that includes Tubal ligation; Ankle surgery; Cholecystectomy; Cholecystectomy (Bilateral, 2001); Total ankle arthroplasty (Left, 2009); Endometrial ablation; Colposcopy w/ biopsy / curettage (2022); and Laparoscopic cholecystectomy.   Family History: family history includes Allergies in her mother and sister; Alzheimer's disease in her mother and another family member; Colon cancer in her maternal grandmother; Dementia in her maternal grandfather; " Depression in her mother; Heart disease in her father and paternal grandmother; Hypertension in her brother and mother; No Known Problems in her maternal aunt, maternal uncle, paternal aunt, and paternal grandfather; Skin cancer in her father and paternal uncle.   Social History: reports that she quit smoking about 3 years ago. Her smoking use included cigarettes. She started smoking about 40 years ago. She has a 18.5 pack-year smoking history. She has been exposed to tobacco smoke. She has never used smokeless tobacco. She reports that she does not currently use alcohol. She reports that she does not use drugs.      Current Outpatient Medications:     amLODIPine (NORVASC) 10 MG tablet, Take 1 tablet by mouth Daily., Disp: 90 tablet, Rfl: 1    busPIRone (BUSPAR) 7.5 MG tablet, TAKE 1 TABLET BY MOUTH TWICE A DAY, Disp: 60 tablet, Rfl: 2    chlorthalidone (HYGROTEN) 50 MG tablet, Take 1 tablet by mouth Daily., Disp: 90 tablet, Rfl: 1    cloNIDine (Catapres) 0.1 MG tablet, Take 1 tablet by mouth 2 (Two) Times a Day As Needed for High Blood Pressure (Blood pressure greater than 160/100)., Disp: 30 tablet, Rfl: 5    clotrimazole (LOTRIMIN) 1 % cream, Apply 1 Application topically to the appropriate area as directed 2 (Two) Times a Day., Disp: 45 g, Rfl: 0    hydrALAZINE (APRESOLINE) 25 MG tablet, Take 1 tablet by mouth 3 (Three) Times a Day., Disp: 90 tablet, Rfl: 3    hydrOXYzine (ATARAX) 25 MG tablet, TAKE ONE TABLET BY MOUTH EVERY 8 HOURS AS NEEDED FOR ANXIETY, Disp: 60 tablet, Rfl: 2    losartan (COZAAR) 100 MG tablet, TAKE 1 TABLET BY MOUTH DAILY, Disp: 90 tablet, Rfl: 1    meloxicam (MOBIC) 15 MG tablet, TAKE ONE TABLET BY MOUTH DAILY, Disp: 90 tablet, Rfl: 1    metoprolol succinate XL (TOPROL-XL) 200 MG 24 hr tablet, TAKE 1 TABLET BY MOUTH DAILY, Disp: 90 tablet, Rfl: 1    mupirocin (BACTROBAN) 2 % ointment, Apply 1 Application topically to the appropriate area as directed 3 (Three) Times a Day., Disp: 1 each,  Rfl: 0    omeprazole (priLOSEC) 20 MG capsule, Take 1 capsule by mouth Daily., Disp: , Rfl:     sertraline (ZOLOFT) 100 MG tablet, Take 1.5 tablets by mouth Daily., Disp: 135 tablet, Rfl: 1    traZODone (DESYREL) 50 MG tablet, TAKE ONE TABLET BY MOUTH EVERY NIGHT AT BEDTIME, Disp: 90 tablet, Rfl: 1    amoxicillin-clavulanate (AUGMENTIN) 875-125 MG per tablet, Take 1 tablet by mouth 2 (Two) Times a Day for 7 days., Disp: 14 tablet, Rfl: 0    Allergies: Gatifloxacin, Crestor [rosuvastatin], Lipitor [atorvastatin], Diethylpropion hcl, and Valacyclovir    Physical Exam  Vitals and nursing note reviewed.   Constitutional:       General: She is not in acute distress.     Appearance: She is not ill-appearing or toxic-appearing.   HENT:      Head: Normocephalic and atraumatic.      Right Ear: Ear canal and external ear normal. A middle ear effusion is present.      Left Ear: Ear canal and external ear normal. A middle ear effusion is present.      Nose: Congestion and rhinorrhea present.      Right Sinus: Maxillary sinus tenderness and frontal sinus tenderness present.      Left Sinus: Maxillary sinus tenderness and frontal sinus tenderness present.      Mouth/Throat:      Pharynx: Posterior oropharyngeal erythema (cobblestoning) and postnasal drip present.      Tonsils: 2+ on the right. 2+ on the left.   Eyes:      General: No scleral icterus.  Cardiovascular:      Rate and Rhythm: Normal rate and regular rhythm.      Pulses: Normal pulses.      Heart sounds: Normal heart sounds. No murmur heard.  Pulmonary:      Effort: Pulmonary effort is normal. No respiratory distress.      Breath sounds: Normal breath sounds. No wheezing, rhonchi or rales.   Skin:     General: Skin is warm.   Neurological:      General: No focal deficit present.      Mental Status: She is alert and oriented to person, place, and time. Mental status is at baseline.   Psychiatric:         Mood and Affect: Mood normal.          Result Review :                Latest Reference Range & Units 02/04/25 10:36   SARS Antigen Not Detected, Presumptive Negative  Not Detected   Expiration Date  09/04/2025   Lot Number  [id number]   Influenza A Antigen TORREY Not Detected  Not Detected   Influenza B Antigen TORREY Not Detected  Not Detected        Assessment and Plan    Assessment & Plan  Cough, unspecified type  Point-of-care COVID and influenza testing negative in office today.  Orders:    POCT SARS-CoV-2 Antigen TORREY + Flu    Primary hypertension   she is not ill-appearing.  She is not tachypneic or tachycardic.  She is not hypoxic.  She is afebrile.  She is hypertensive at 178/112.  She denies chest pain, shortness of breath, visual disturbances, headaches or lower extremity edema.  She states that she did take one of her clonidine pills after her blood pressure was taken.  She states that a recheck of her blood pressure would likely not be accurate at this time as her medication has not had time to fully kick in.  Will provide refill of her clonidine today.  Recommend that she monitor this closely at home.  Discussed that if her blood pressure remains greater than 140/90, she seek medical evaluation at ED.    Orders:    cloNIDine (Catapres) 0.1 MG tablet; Take 1 tablet by mouth 2 (Two) Times a Day As Needed for High Blood Pressure (Blood pressure greater than 160/100).    Acute non-recurrent pansinusitis  Physical exam findings and symptom profile appear consistent with pansinusitis.  Will treat with Augmentin 875-125 mg twice daily for a week.  Recommend she take with food to prevent GI upset.  Recommend she can also utilize over-the-counter oral antihistamine such as Claritin, Allegra, or Zyrtec.  Discussed that she can utilize Flonase nasal spray as well for her symptoms.  Orders:    amoxicillin-clavulanate (AUGMENTIN) 875-125 MG per tablet; Take 1 tablet by mouth 2 (Two) Times a Day for 7 days.    Recommend if symptoms worsen or fail to improve, she seek medical evaluation in  ED, specifically for her hypertension.  Patient is agreeable to this and verbalized understanding of plan of care.    Follow Up   Return if symptoms worsen or fail to improve, for Next scheduled follow up.  Patient was given instructions and counseling regarding her condition or for health maintenance advice. Please see specific information pulled into the AVS if appropriate.     Enma Hallman APRN

## 2025-04-16 ENCOUNTER — OFFICE VISIT (OUTPATIENT)
Dept: FAMILY MEDICINE CLINIC | Facility: CLINIC | Age: 60
End: 2025-04-16
Payer: MEDICAID

## 2025-04-16 VITALS
HEIGHT: 62 IN | DIASTOLIC BLOOD PRESSURE: 70 MMHG | TEMPERATURE: 99 F | WEIGHT: 180 LBS | BODY MASS INDEX: 33.13 KG/M2 | SYSTOLIC BLOOD PRESSURE: 148 MMHG | HEART RATE: 70 BPM | RESPIRATION RATE: 16 BRPM | OXYGEN SATURATION: 97 %

## 2025-04-16 DIAGNOSIS — Z12.31 ENCOUNTER FOR SCREENING MAMMOGRAM FOR MALIGNANT NEOPLASM OF BREAST: ICD-10-CM

## 2025-04-16 DIAGNOSIS — Z13.1 SCREENING FOR DIABETES MELLITUS (DM): ICD-10-CM

## 2025-04-16 DIAGNOSIS — Z13.29 SCREENING FOR THYROID DISORDER: ICD-10-CM

## 2025-04-16 DIAGNOSIS — Z00.00 ANNUAL PHYSICAL EXAM: Primary | ICD-10-CM

## 2025-04-16 DIAGNOSIS — F33.1 MODERATE EPISODE OF RECURRENT MAJOR DEPRESSIVE DISORDER: ICD-10-CM

## 2025-04-16 DIAGNOSIS — I10 PRIMARY HYPERTENSION: ICD-10-CM

## 2025-04-16 DIAGNOSIS — F41.1 GAD (GENERALIZED ANXIETY DISORDER): ICD-10-CM

## 2025-04-16 PROBLEM — B27.90 INFECTIOUS MONONUCLEOSIS WITHOUT COMPLICATION: Status: RESOLVED | Noted: 2025-04-16 | Resolved: 2025-04-16

## 2025-04-16 PROBLEM — M19.90 ARTHRITIS: Status: ACTIVE | Noted: 2025-04-16

## 2025-04-16 PROBLEM — J03.90 TONSILLITIS: Status: RESOLVED | Noted: 2025-04-16 | Resolved: 2025-04-16

## 2025-04-16 PROBLEM — J18.9 PNEUMONIA DUE TO INFECTIOUS ORGANISM: Status: RESOLVED | Noted: 2025-04-16 | Resolved: 2025-04-16

## 2025-04-16 PROBLEM — R22.9 SUBCUTANEOUS MASS: Status: RESOLVED | Noted: 2025-04-16 | Resolved: 2025-04-16

## 2025-04-16 PROBLEM — S63.651A SPRAIN OF METACARPOPHALANGEAL JOINT OF LEFT INDEX FINGER: Status: RESOLVED | Noted: 2025-04-16 | Resolved: 2025-04-16

## 2025-04-16 PROBLEM — I00 RHEUMATIC FEVER: Status: RESOLVED | Noted: 2025-04-16 | Resolved: 2025-04-16

## 2025-04-16 RX ORDER — HYDRALAZINE HYDROCHLORIDE 25 MG/1
25 TABLET, FILM COATED ORAL 3 TIMES DAILY
Qty: 90 TABLET | Refills: 1 | Status: SHIPPED | OUTPATIENT
Start: 2025-04-16

## 2025-04-16 RX ORDER — SERTRALINE HYDROCHLORIDE 100 MG/1
150 TABLET, FILM COATED ORAL DAILY
Qty: 135 TABLET | Refills: 0 | Status: SHIPPED | OUTPATIENT
Start: 2025-04-16

## 2025-04-16 RX ORDER — BUSPIRONE HYDROCHLORIDE 7.5 MG/1
7.5 TABLET ORAL 2 TIMES DAILY
Qty: 180 TABLET | Refills: 0 | Status: SHIPPED | OUTPATIENT
Start: 2025-04-16

## 2025-04-16 RX ORDER — CLOTRIMAZOLE 1 %
1 CREAM (GRAM) TOPICAL 2 TIMES DAILY
Qty: 45 G | Refills: 0 | Status: SHIPPED | OUTPATIENT
Start: 2025-04-16

## 2025-04-16 RX ORDER — MELOXICAM 15 MG/1
15 TABLET ORAL DAILY
Qty: 90 TABLET | Refills: 0 | Status: SHIPPED | OUTPATIENT
Start: 2025-04-16

## 2025-04-16 RX ORDER — METOPROLOL SUCCINATE 200 MG/1
200 TABLET, EXTENDED RELEASE ORAL DAILY
Qty: 90 TABLET | Refills: 1 | Status: SHIPPED | OUTPATIENT
Start: 2025-04-16

## 2025-04-16 RX ORDER — AMLODIPINE BESYLATE 10 MG/1
10 TABLET ORAL DAILY
Qty: 90 TABLET | Refills: 1 | Status: SHIPPED | OUTPATIENT
Start: 2025-04-16

## 2025-04-16 RX ORDER — CLONIDINE HYDROCHLORIDE 0.1 MG/1
0.1 TABLET ORAL 2 TIMES DAILY PRN
Qty: 180 TABLET | Refills: 0 | Status: SHIPPED | OUTPATIENT
Start: 2025-04-16

## 2025-04-16 RX ORDER — OMEPRAZOLE 20 MG/1
20 CAPSULE, DELAYED RELEASE ORAL DAILY
Qty: 90 CAPSULE | Refills: 0 | Status: SHIPPED | OUTPATIENT
Start: 2025-04-16

## 2025-04-16 RX ORDER — CHLORTHALIDONE 50 MG/1
25 TABLET ORAL DAILY
Qty: 90 TABLET | Refills: 1 | Status: SHIPPED | OUTPATIENT
Start: 2025-04-16

## 2025-04-16 RX ORDER — MUPIROCIN 20 MG/G
1 OINTMENT TOPICAL 3 TIMES DAILY
Qty: 1 EACH | Refills: 0 | Status: SHIPPED | OUTPATIENT
Start: 2025-04-16

## 2025-04-16 RX ORDER — TRAZODONE HYDROCHLORIDE 50 MG/1
50 TABLET ORAL
Qty: 90 TABLET | Refills: 0 | Status: SHIPPED | OUTPATIENT
Start: 2025-04-16

## 2025-04-16 RX ORDER — HYDROXYZINE HYDROCHLORIDE 25 MG/1
25 TABLET, FILM COATED ORAL EVERY 8 HOURS PRN
Qty: 90 TABLET | Refills: 0 | Status: SHIPPED | OUTPATIENT
Start: 2025-04-16

## 2025-04-16 RX ORDER — LOSARTAN POTASSIUM 100 MG/1
100 TABLET ORAL DAILY
Qty: 90 TABLET | Refills: 1 | Status: SHIPPED | OUTPATIENT
Start: 2025-04-16

## 2025-04-16 NOTE — ASSESSMENT & PLAN NOTE
Hypertension currently treated  Taking medications as directed   Denies missed doses, denies side effects   Does not report any headaches, blurry vision, dizziness, chest pain, shortness of breath, or palpitations.      Plan:  Continue current medications  Patient agrees to monitor home blood pressures and bring log sheet to follow-up visit with PCP  Report back to the office if blood pressures consistently >140/90  Pt verbalizes understanding if she develops chest pain, shortness of breath or other alarm symptoms she should call 911 or go to the ER as appropriate.    Orders:    amLODIPine (NORVASC) 10 MG tablet; Take 1 tablet by mouth Daily.    chlorthalidone (HYGROTEN) 50 MG tablet; Take 0.5 tablets by mouth Daily.    cloNIDine (Catapres) 0.1 MG tablet; Take 1 tablet by mouth 2 (Two) Times a Day As Needed for High Blood Pressure (Blood pressure greater than 160/100).    hydrALAZINE (APRESOLINE) 25 MG tablet; Take 1 tablet by mouth 3 (Three) Times a Day.    metoprolol succinate XL (TOPROL-XL) 200 MG 24 hr tablet; Take 1 tablet by mouth Daily.

## 2025-04-16 NOTE — ASSESSMENT & PLAN NOTE
Psychological condition is stable.  Continue current treatment regimen.  Psychological condition  will be reassessed at the next regular appointment.    Orders:    busPIRone (BUSPAR) 7.5 MG tablet; Take 1 tablet by mouth 2 (Two) Times a Day.    hydrOXYzine (ATARAX) 25 MG tablet; Take 1 tablet by mouth Every 8 (Eight) Hours As Needed for Anxiety. for anxiety

## 2025-04-16 NOTE — PATIENT INSTRUCTIONS
Health Maintenance, Female  Adopting a healthy lifestyle and getting preventive care can go a long way to promote health and wellness. Talk with your health care provider about what schedule of regular examinations is right for you. This is a good chance for you to check in with your provider about disease prevention and staying healthy.  In between checkups, there are plenty of things you can do on your own. Experts have done a lot of research about which lifestyle changes and preventive measures are most likely to keep you healthy. Ask your health care provider for more information.  Weight and diet  Eat a healthy diet  Be sure to include plenty of vegetables, fruits, low-fat dairy products, and lean protein.  Do not eat a lot of foods high in solid fats, added sugars, or salt.  Get regular exercise. This is one of the most important things you can do for your health.  Most adults should exercise for at least 150 minutes each week. The exercise should increase your heart rate and make you sweat (moderate-intensity exercise).  Most adults should also do strengthening exercises at least twice a week. This is in addition to the moderate-intensity exercise.     Maintain a healthy weight  Body mass index (BMI) is a measurement that can be used to identify possible weight problems. It estimates body fat based on height and weight. Your health care provider can help determine your BMI and help you achieve or maintain a healthy weight.  For females 20 years of age and older:  A BMI below 18.5 is considered underweight.  A BMI of 18.5 to 24.9 is normal.  A BMI of 25 to 29.9 is considered overweight.  A BMI of 30 and above is considered obese.     Watch levels of cholesterol and blood lipids  You should start having your blood tested for lipids and cholesterol at 20 years of age, then have this test every 5 years.  You may need to have your cholesterol levels checked more often if:  Your lipid or cholesterol levels are  high.  You are older than 50 years of age.  You are at high risk for heart disease.     Cancer screening  Lung Cancer  Lung cancer screening is recommended for adults 55-80 years old who are at high risk for lung cancer because of a history of smoking.  A yearly low-dose CT scan of the lungs is recommended for people who:  Currently smoke.  Have quit within the past 15 years.  Have at least a 30-pack-year history of smoking. A pack year is smoking an average of one pack of cigarettes a day for 1 year.  Yearly screening should continue until it has been 15 years since you quit.  Yearly screening should stop if you develop a health problem that would prevent you from having lung cancer treatment.     Breast Cancer  Practice breast self-awareness. This means understanding how your breasts normally appear and feel.  It also means doing regular breast self-exams. Let your health care provider know about any changes, no matter how small.  If you are in your 20s or 30s, you should have a clinical breast exam (CBE) by a health care provider every 1-3 years as part of a regular health exam.  If you are 40 or older, have a CBE every year. Also consider having a breast X-ray (mammogram) every year.  If you have a family history of breast cancer, talk to your health care provider about genetic screening.  If you are at high risk for breast cancer, talk to your health care provider about having an MRI and a mammogram every year.  Breast cancer gene (BRCA) assessment is recommended for women who have family members with BRCA-related cancers. BRCA-related cancers include:  Breast.  Ovarian.  Tubal.  Peritoneal cancers.  Results of the assessment will determine the need for genetic counseling and BRCA1 and BRCA2 testing.     Cervical Cancer  Your health care provider may recommend that you be screened regularly for cancer of the pelvic organs (ovaries, uterus, and vagina). This screening involves a pelvic examination, including  checking for microscopic changes to the surface of your cervix (Pap test). You may be encouraged to have this screening done every 3 years, beginning at age 21.  For women ages 30-65, health care providers may recommend pelvic exams and Pap testing every 3 years, or they may recommend the Pap and pelvic exam, combined with testing for human papilloma virus (HPV), every 5 years. Some types of HPV increase your risk of cervical cancer. Testing for HPV may also be done on women of any age with unclear Pap test results.  Other health care providers may not recommend any screening for nonpregnant women who are considered low risk for pelvic cancer and who do not have symptoms. Ask your health care provider if a screening pelvic exam is right for you.  If you have had past treatment for cervical cancer or a condition that could lead to cancer, you need Pap tests and screening for cancer for at least 20 years after your treatment. If Pap tests have been discontinued, your risk factors (such as having a new sexual partner) need to be reassessed to determine if screening should resume. Some women have medical problems that increase the chance of getting cervical cancer. In these cases, your health care provider may recommend more frequent screening and Pap tests.     Colorectal Cancer  This type of cancer can be detected and often prevented.  Routine colorectal cancer screening usually begins at 50 years of age and continues through 75 years of age.  Your health care provider may recommend screening at an earlier age if you have risk factors for colon cancer.  Your health care provider may also recommend using home test kits to check for hidden blood in the stool.  A small camera at the end of a tube can be used to examine your colon directly (sigmoidoscopy or colonoscopy). This is done to check for the earliest forms of colorectal cancer.  Routine screening usually begins at age 50.  Direct examination of the colon should  be repeated every 5-10 years through 75 years of age. However, you may need to be screened more often if early forms of precancerous polyps or small growths are found.     Skin Cancer  Check your skin from head to toe regularly.  Tell your health care provider about any new moles or changes in moles, especially if there is a change in a mole's shape or color.  Also tell your health care provider if you have a mole that is larger than the size of a pencil eraser.  Always use sunscreen. Apply sunscreen liberally and repeatedly throughout the day.  Protect yourself by wearing long sleeves, pants, a wide-brimmed hat, and sunglasses whenever you are outside.     Heart disease, diabetes, and high blood pressure  High blood pressure causes heart disease and increases the risk of stroke. High blood pressure is more likely to develop in:  People who have blood pressure in the high end of the normal range (130-139/85-89 mm Hg).  People who are overweight or obese.  People who are .  If you are 18-39 years of age, have your blood pressure checked every 3-5 years. If you are 40 years of age or older, have your blood pressure checked every year. You should have your blood pressure measured twice--once when you are at a hospital or clinic, and once when you are not at a hospital or clinic. Record the average of the two measurements. To check your blood pressure when you are not at a hospital or clinic, you can use:  An automated blood pressure machine at a pharmacy.  A home blood pressure monitor.  If you are between 55 years and 79 years old, ask your health care provider if you should take aspirin to prevent strokes.  Have regular diabetes screenings. This involves taking a blood sample to check your fasting blood sugar level.  If you are at a normal weight and have a low risk for diabetes, have this test once every three years after 45 years of age.  If you are overweight and have a high risk for diabetes,  consider being tested at a younger age or more often.  Preventing infection  Hepatitis B  If you have a higher risk for hepatitis B, you should be screened for this virus. You are considered at high risk for hepatitis B if:  You were born in a country where hepatitis B is common. Ask your health care provider which countries are considered high risk.  Your parents were born in a high-risk country, and you have not been immunized against hepatitis B (hepatitis B vaccine).  You have HIV or AIDS.  You use needles to inject street drugs.  You live with someone who has hepatitis B.  You have had sex with someone who has hepatitis B.  You get hemodialysis treatment.  You take certain medicines for conditions, including cancer, organ transplantation, and autoimmune conditions.     Hepatitis C  Blood testing is recommended for:  Everyone born from 1945 through 1965.  Anyone with known risk factors for hepatitis C.     Sexually transmitted infections (STIs)  You should be screened for sexually transmitted infections (STIs) including gonorrhea and chlamydia if:  You are sexually active and are younger than 24 years of age.  You are older than 24 years of age and your health care provider tells you that you are at risk for this type of infection.  Your sexual activity has changed since you were last screened and you are at an increased risk for chlamydia or gonorrhea. Ask your health care provider if you are at risk.  If you do not have HIV, but are at risk, it may be recommended that you take a prescription medicine daily to prevent HIV infection. This is called pre-exposure prophylaxis (PrEP). You are considered at risk if:  You are sexually active and do not regularly use condoms or know the HIV status of your partner(s).  You take drugs by injection.  You are sexually active with a partner who has HIV.     Talk with your health care provider about whether you are at high risk of being infected with HIV. If you choose to  begin PrEP, you should first be tested for HIV. You should then be tested every 3 months for as long as you are taking PrEP.  Pregnancy  If you are premenopausal and you may become pregnant, ask your health care provider about preconception counseling.  If you may become pregnant, take 400 to 800 micrograms (mcg) of folic acid every day.  If you want to prevent pregnancy, talk to your health care provider about birth control (contraception).  Osteoporosis and menopause  Osteoporosis is a disease in which the bones lose minerals and strength with aging. This can result in serious bone fractures. Your risk for osteoporosis can be identified using a bone density scan.  If you are 65 years of age or older, or if you are at risk for osteoporosis and fractures, ask your health care provider if you should be screened.  Ask your health care provider whether you should take a calcium or vitamin D supplement to lower your risk for osteoporosis.  Menopause may have certain physical symptoms and risks.  Hormone replacement therapy may reduce some of these symptoms and risks.  Talk to your health care provider about whether hormone replacement therapy is right for you.  Follow these instructions at home:  Schedule regular health, dental, and eye exams.  Stay current with your immunizations.  Do not use any tobacco products including cigarettes, chewing tobacco, or electronic cigarettes.  If you are pregnant, do not drink alcohol.  If you are breastfeeding, limit how much and how often you drink alcohol.  Limit alcohol intake to no more than 1 drink per day for nonpregnant women. One drink equals 12 ounces of beer, 5 ounces of wine, or 1½ ounces of hard liquor.  Do not use street drugs.  Do not share needles.  Ask your health care provider for help if you need support or information about quitting drugs.  Tell your health care provider if you often feel depressed.  Tell your health care provider if you have ever been abused or do  not feel safe at home.  This information is not intended to replace advice given to you by your health care provider. Make sure you discuss any questions you have with your health care provider.  Document Released: 07/02/2012 Document Revised: 05/25/2017 Document Reviewed: 09/20/2016  [employer] Patient Education © 2018 [employer]

## 2025-04-16 NOTE — PROGRESS NOTES
"     New Patient Office Visit      Date: 2025  Patient Name: Rajani Valentine  : 1965   MRN: [id number]     Chief Complaint   Patient presents with    Establish Care     Previously seen by [facility]     History of Present Illness: Rajani Valentine is a 59 y.o. female who is here today for a get acquainted visit to establish care with a new provider and to complete annual wellness exam.  She is concerned today about some issues with swallowing difficulty related to Bell's palsy and also reports a strange \"vibration\" type sensation that is accompanied by some weakness.  Rajani otherwise denies any new or recent illness or injury. Denies falls, unexplained weight change, fevers, chills, or other constitutional symptoms. I have reviewed Rajani's medical history with her  and updated the computerized patient record. Baseline screening tests were discussed today and she agrees to discuss health maintenance and goals in future appointments.    Some issues with swallowing r/t Belles Palsey  \"Vibration\" sensation - weakness     Subjective     Past Medical History:   Diagnosis Date    Abnormal Pap smear of cervix     Anxiety     Bell's palsy 2022    Depression     Endometriosis     Esophageal reflux     HPV (human papilloma virus) infection     Hyperlipidemia     Hypertension     Infectious mononucleosis without complication 2025    Irritable bowel syndrome     Multiple gestation     Ovarian cyst     Rheumatic fever 2025    Sprain of metacarpophalangeal joint of left index finger 2025    Subcutaneous mass 2025    Tonsillitis 2025    Ulcerative colitis     Varicella        Past Surgical History:   Procedure Laterality Date    ANKLE ARTHROPLASTY Left 2009    ANKLE SURGERY      CHOLECYSTECTOMY      CHOLECYSTECTOMY Bilateral     COLPOSCOPY W/ BIOPSY / CURETTAGE      [facility]    ENDOMETRIAL ABLATION      LAPAROSCOPIC CHOLECYSTECTOMY      TUBAL ABDOMINAL " LIGATION         Family History   Problem Relation Age of Onset    Skin cancer Father     Heart disease Father     Hypertension Mother     Alzheimer's disease Mother     Allergies Mother     Depression Mother     Hypertension Brother     Allergies Sister     No Known Problems Paternal Grandfather     Heart disease Paternal Grandmother     Colon cancer Maternal Grandmother     Dementia Maternal Grandfather     No Known Problems Maternal Aunt     No Known Problems Maternal Uncle     No Known Problems Paternal Aunt     Skin cancer Paternal Uncle     Alzheimer's disease Other     Breast cancer Neg Hx     Ovarian cancer Neg Hx     Uterine cancer Neg Hx        Social History     Socioeconomic History    Marital status: Significant Other    Number of children: 3   Tobacco Use    Smoking status: Former     Current packs/day: 0.00     Average packs/day: 0.5 packs/day for 37.0 years (18.5 ttl pk-yrs)     Types: Cigarettes     Start date: 1/1/1985     Quit date: 2022     Years since quitting: 3.3     Passive exposure: Past    Smokeless tobacco: Never    Tobacco comments:     quit 2 years ago   Vaping Use    Vaping status: Never Used   Substance and Sexual Activity    Alcohol use: Not Currently     Comment: socially    Drug use: Yes     Types: Marijuana    Sexual activity: Not Currently     Partners: Male     Birth control/protection: Post-menopausal, Tubal ligation, Surgical     Comment: BTL        Current Outpatient Medications   Medication Instructions    amLODIPine (NORVASC) 10 mg, Oral, Daily    busPIRone (BUSPAR) 7.5 mg, Oral, 2 Times Daily    chlorthalidone (HYGROTEN) 25 mg, Oral, Daily    cloNIDine (CATAPRES) 0.1 mg, Oral, 2 Times Daily PRN    clotrimazole (LOTRIMIN) 1 % cream 1 Application, Topical, 2 Times Daily    hydrALAZINE (APRESOLINE) 25 mg, Oral, 3 Times Daily    hydrOXYzine (ATARAX) 25 mg, Oral, Every 8 Hours PRN, for anxiety    losartan (COZAAR) 100 mg, Oral, Daily    meloxicam (MOBIC) 15 mg, Oral, Daily     metoprolol succinate XL (TOPROL-XL) 200 mg, Oral, Daily    mupirocin (BACTROBAN) 2 % ointment 1 Application, Topical, 3 Times Daily    omeprazole (PRILOSEC) 20 mg, Oral, Daily    sertraline (ZOLOFT) 150 mg, Oral, Daily    traZODone (DESYREL) 50 mg, Oral, Every Night at Bedtime        Allergies   Allergen Reactions    Gatifloxacin Unknown - High Severity    Crestor [Rosuvastatin] Myalgia    Lipitor [Atorvastatin] Myalgia    Diethylpropion Hcl Unknown - Low Severity    Valacyclovir Unknown - Low Severity       Health Maintenance Summary            Awaiting Completion       MAMMOGRAM (Every 2 Years) Order placed this encounter      04/16/2025  Order placed for Mammo Screening Digital Tomosynthesis Bilateral With CAD by Gris Smith APRN    05/12/2023  Mammo Screening Bilateral With CAD    09/28/2021  [facility] MAMMOGRAPHY    09/28/2021  Mammo Screening Digital Tomosynthesis Bilateral With CAD    06/06/2019  SCANNED - MAMMO     Only the first 5 history entries have been loaded, but more history exists.                    Upcoming       COVID-19 Vaccine (4 - 2024-25 season) Postponed until 4/30/2025 04/16/2025  Postponed until 4/30/2025 by Adrianna Hudson MA (Product Unavailable)    05/03/2022  Imm Admin: COVID-19 (UNSPECIFIED)    08/25/2021  Imm Admin: COVID-19 (PFIZER) Purple Cap Monovalent    08/04/2021  Imm Admin: COVID-19 (PFIZER) Purple Cap Monovalent              Pneumococcal Vaccine 50+ (1 of 1 - PCV) Postponed until 10/13/2025      04/16/2025  Postponed until 10/13/2025 by Adrianna Hudson MA (Patient Refused)              TDAP/TD VACCINES (2 - Td or Tdap) Postponed until 10/13/2025      04/16/2025  Postponed until 10/13/2025 by Adrianna Hudson MA (Patient Refused)    08/01/2010  Imm Admin: Tdap              ZOSTER VACCINE (1 of 2) Postponed until 10/13/2025      04/16/2025  Postponed until 10/13/2025 by Adrianna Hudson MA (Patient Refused)              COLORECTAL CANCER SCREENING (View Topic Details)  "Postponed until 10/16/2025      04/16/2025  Postponed until 10/16/2025 by Gris Smith APRN (Patient Refused - Pt would like to postpone)              INFLUENZA VACCINE (Yearly - July to March) Next due on 7/1/2025 06/11/2019  Imm Admin: Fluzone  >6mos    10/25/2018  Imm Admin: Influenza, Unspecified              ANNUAL PHYSICAL (Yearly) Next due on 4/16/2026 04/16/2025  Done    04/18/2022  Registry Metric: Last Annual Physical              LIPID PANEL (Yearly) Next due on 4/16/2026 04/16/2025  Lipid Panel    05/07/2024  Lipid Panel    05/19/2023  Lipid Panel    04/18/2022  Lipid Panel              PAP SMEAR (Every 3 Years) Next due on 12/16/2027 12/16/2024  IGP, Apt HPV,rfx 16 / 18,45    06/14/2024  IGP, Apt HPV,rfx 16 / 18,45    09/14/2022  IgP, Aptima HPV    09/01/2021  IgP, Aptima HPV    06/16/2021  IgP, Aptima HPV      Only the first 5 history entries have been loaded, but more history exists.                      Completed or No Longer Recommended       HEPATITIS C SCREENING  Completed      02/06/2020  Outside Procedure: CHG HEPATITIS C AB TEST                             Objective     Vitals:    04/16/25 1106 04/16/25 1115   BP: 150/90 148/70   BP Location: Left arm Left arm   Patient Position: Sitting Sitting   Cuff Size: Adult Adult   Pulse: 70    Resp: 16    Temp: 99 °F (37.2 °C)    TempSrc: Oral    SpO2: 97%    Weight: 81.6 kg (180 lb)    Height: 157.5 cm (62\")    PainSc: 8     PainLoc: Wrist           Physical Exam  Vitals and nursing note reviewed.   Constitutional:       General: She is not in acute distress.     Appearance: Normal appearance.   HENT:      Head: Normocephalic.      Right Ear: Tympanic membrane normal.      Left Ear: Tympanic membrane normal.      Nose: Nose normal.      Mouth/Throat:      Mouth: Mucous membranes are moist.   Eyes:      Pupils: Pupils are equal, round, and reactive to light.   Neck:      Thyroid: No thyromegaly or thyroid tenderness. "   Cardiovascular:      Rate and Rhythm: Normal rate and regular rhythm.      Pulses: Normal pulses.      Heart sounds: Normal heart sounds.   Pulmonary:      Effort: Pulmonary effort is normal.      Breath sounds: Normal breath sounds.   Abdominal:      General: Bowel sounds are normal.      Palpations: Abdomen is soft.   Musculoskeletal:         General: Normal range of motion.      Cervical back: Normal range of motion.      Right lower leg: No edema.      Left lower leg: No edema.   Lymphadenopathy:      Cervical: No cervical adenopathy.   Skin:     General: Skin is warm and dry.      Capillary Refill: Capillary refill takes less than 2 seconds.   Neurological:      Mental Status: She is alert and oriented to person, place, and time. Mental status is at baseline.   Psychiatric:         Mood and Affect: Mood normal.         Behavior: Behavior normal.             Assessment / Plan         Annual physical exam  New patient/est care visit  Reviewed pt's medical hx with her  Appropriate diagnostic testing ordered and will be reviewed in subsequent f/u visit.  Follow-up in  2-4 weeks  to discuss results, health goals, and address care gaps.      Orders:    CBC Auto Differential; Future    Comprehensive Metabolic Panel; Future    Lipid Panel; Future    Primary hypertension  Hypertension currently treated  Taking medications as directed   Denies missed doses, denies side effects   Does not report any headaches, blurry vision, dizziness, chest pain, shortness of breath, or palpitations.      Plan:  Continue current medications  Patient agrees to monitor home blood pressures and bring log sheet to follow-up visit with PCP  Report back to the office if blood pressures consistently >140/90  Pt verbalizes understanding if she develops chest pain, shortness of breath or other alarm symptoms she should call 911 or go to the ER as appropriate.    Orders:    amLODIPine (NORVASC) 10 MG tablet; Take 1 tablet by mouth Daily.     chlorthalidone (HYGROTEN) 50 MG tablet; Take 0.5 tablets by mouth Daily.    cloNIDine (Catapres) 0.1 MG tablet; Take 1 tablet by mouth 2 (Two) Times a Day As Needed for High Blood Pressure (Blood pressure greater than 160/100).    hydrALAZINE (APRESOLINE) 25 MG tablet; Take 1 tablet by mouth 3 (Three) Times a Day.    metoprolol succinate XL (TOPROL-XL) 200 MG 24 hr tablet; Take 1 tablet by mouth Daily.    DANETTE (generalized anxiety disorder)  Psychological condition is stable.  Continue current treatment regimen.  Psychological condition  will be reassessed at the next regular appointment.    Orders:    busPIRone (BUSPAR) 7.5 MG tablet; Take 1 tablet by mouth 2 (Two) Times a Day.    hydrOXYzine (ATARAX) 25 MG tablet; Take 1 tablet by mouth Every 8 (Eight) Hours As Needed for Anxiety. for anxiety    Moderate episode of recurrent major depressive disorder  Patient's depression is  intermittet episodes  that is mild without psychosis. Depression is in partial remission and stable.    Plan:   Continue current medication therapy     Followup at the next regular appointment.     Orders:    sertraline (ZOLOFT) 100 MG tablet; Take 1.5 tablets by mouth Daily.    Encounter for screening mammogram for malignant neoplasm of breast    Orders:    Mammo Screening Digital Tomosynthesis Bilateral With CAD; Future    Screening for diabetes mellitus (DM)    Orders:    Hemoglobin A1c; Future    Screening for thyroid disorder    Orders:    TSH Rfx On Abnormal To Free T4; Future        Healthcare Maintenance:  Counseling provided based on age appropriate USPSTF guidelines.         Recommendations:  begin progressive daily aerobic exercise program, attempt to lose weight, reduce salt in diet and cooking, and improve dietary compliance    Rajani Valentine voices understanding and acceptance of this advice and will call back with any further questions or concerns. AVS with preventive healthcare tips printed for patient.       Patient  Education:   Reviewed medications, potential side effects and signs and symptoms to report.   Discussed risk vs benefits of treatment plan with patient including medications, labs, and imaging.    Patient education materials attached to AVS and reviewed with patient during office visit today.   Addressed questions and concerns during visit. Patient verbalized understanding and agrees with tx plan.   Instructed to call the office with any questions and report to ER with any life-threatening symptoms.    Return in 2 weeks (on 4/30/2025) for Follow-up.    SKY Duff ([facility]  [address] [address]  [phone]    NOTE TO PATIENT:   The 21st Century Cures Act makes medical notes like these available to patients in the interest of transparency. However, be advised this is a medical document. It is intended as peer to peer communication. It is written in medical language and may contain abbreviations or verbiage that are unfamiliar. It may appear blunt or direct. Medical documents are intended to carry relevant information, facts as evident, and the clinical opinion of the practitioner.     EMR Dragon/Transcription disclaimer:   Much of this encounter note is an electronic transcription of spoken language to printed text. Electronic transcription of spoken language may permit erroneous, or at times, nonsensical words or phrases to be inadvertently transcribed. Although I have reviewed the note for such errors, some may still exist.

## 2025-04-16 NOTE — ASSESSMENT & PLAN NOTE
Patient's depression is intermittet episodes that is mild without psychosis. Depression is in partial remission and stable.    Plan:   Continue current medication therapy     Followup at the next regular appointment.     Orders:    sertraline (ZOLOFT) 100 MG tablet; Take 1.5 tablets by mouth Daily.

## 2025-04-17 LAB
ALBUMIN SERPL-MCNC: 4.4 G/DL (ref 3.8–4.9)
ALP SERPL-CCNC: 135 IU/L (ref 44–121)
ALT SERPL-CCNC: 13 IU/L (ref 0–32)
AST SERPL-CCNC: 18 IU/L (ref 0–40)
BASOPHILS # BLD AUTO: 0 X10E3/UL (ref 0–0.2)
BASOPHILS NFR BLD AUTO: 1 %
BILIRUB SERPL-MCNC: 0.4 MG/DL (ref 0–1.2)
BUN SERPL-MCNC: 15 MG/DL (ref 6–24)
BUN/CREAT SERPL: 16 (ref 9–23)
CALCIUM SERPL-MCNC: 9.8 MG/DL (ref 8.7–10.2)
CHLORIDE SERPL-SCNC: 107 MMOL/L (ref 96–106)
CHOLEST SERPL-MCNC: 203 MG/DL (ref 100–199)
CO2 SERPL-SCNC: 21 MMOL/L (ref 20–29)
CREAT SERPL-MCNC: 0.93 MG/DL (ref 0.57–1)
EGFRCR SERPLBLD CKD-EPI 2021: 71 ML/MIN/1.73
EOSINOPHIL # BLD AUTO: 0.1 X10E3/UL (ref 0–0.4)
EOSINOPHIL NFR BLD AUTO: 2 %
ERYTHROCYTE [DISTWIDTH] IN BLOOD BY AUTOMATED COUNT: 13.9 % (ref 11.7–15.4)
GLOBULIN SER CALC-MCNC: 2.2 G/DL (ref 1.5–4.5)
GLUCOSE SERPL-MCNC: 91 MG/DL (ref 70–99)
HBA1C MFR BLD: 5.7 % (ref 4.8–5.6)
HCT VFR BLD AUTO: 41.2 % (ref 34–46.6)
HDLC SERPL-MCNC: 39 MG/DL
HGB BLD-MCNC: 13.2 G/DL (ref 11.1–15.9)
IMM GRANULOCYTES # BLD AUTO: 0 X10E3/UL (ref 0–0.1)
IMM GRANULOCYTES NFR BLD AUTO: 0 %
LDLC SERPL CALC-MCNC: 135 MG/DL (ref 0–99)
LYMPHOCYTES # BLD AUTO: 1.6 X10E3/UL (ref 0.7–3.1)
LYMPHOCYTES NFR BLD AUTO: 28 %
MCH RBC QN AUTO: 28.3 PG (ref 26.6–33)
MCHC RBC AUTO-ENTMCNC: 32 G/DL (ref 31.5–35.7)
MCV RBC AUTO: 88 FL (ref 79–97)
MONOCYTES # BLD AUTO: 0.6 X10E3/UL (ref 0.1–0.9)
MONOCYTES NFR BLD AUTO: 10 %
NEUTROPHILS # BLD AUTO: 3.3 X10E3/UL (ref 1.4–7)
NEUTROPHILS NFR BLD AUTO: 59 %
PLATELET # BLD AUTO: 216 X10E3/UL (ref 150–450)
POTASSIUM SERPL-SCNC: 4.2 MMOL/L (ref 3.5–5.2)
PROT SERPL-MCNC: 6.6 G/DL (ref 6–8.5)
RBC # BLD AUTO: 4.66 X10E6/UL (ref 3.77–5.28)
SODIUM SERPL-SCNC: 142 MMOL/L (ref 134–144)
TRIGL SERPL-MCNC: 159 MG/DL (ref 0–149)
TSH SERPL DL<=0.005 MIU/L-ACNC: 0.74 UIU/ML (ref 0.45–4.5)
VLDLC SERPL CALC-MCNC: 29 MG/DL (ref 5–40)
WBC # BLD AUTO: 5.6 X10E3/UL (ref 3.4–10.8)

## 2025-04-30 ENCOUNTER — OFFICE VISIT (OUTPATIENT)
Dept: FAMILY MEDICINE CLINIC | Facility: CLINIC | Age: 60
End: 2025-04-30
Payer: MEDICAID

## 2025-04-30 VITALS
SYSTOLIC BLOOD PRESSURE: 134 MMHG | BODY MASS INDEX: 32.9 KG/M2 | OXYGEN SATURATION: 96 % | RESPIRATION RATE: 16 BRPM | WEIGHT: 178.8 LBS | HEART RATE: 70 BPM | DIASTOLIC BLOOD PRESSURE: 100 MMHG | HEIGHT: 62 IN | TEMPERATURE: 98.5 F

## 2025-04-30 DIAGNOSIS — R73.03 PREDIABETES: Primary | ICD-10-CM

## 2025-04-30 DIAGNOSIS — Z12.31 ENCOUNTER FOR SCREENING MAMMOGRAM FOR MALIGNANT NEOPLASM OF BREAST: ICD-10-CM

## 2025-04-30 DIAGNOSIS — M79.641 PAIN OF RIGHT HAND: ICD-10-CM

## 2025-04-30 DIAGNOSIS — I10 PRIMARY HYPERTENSION: ICD-10-CM

## 2025-04-30 DIAGNOSIS — E78.5 HYPERLIPIDEMIA LDL GOAL <100: ICD-10-CM

## 2025-04-30 RX ORDER — PRAVASTATIN SODIUM 10 MG
10 TABLET ORAL DAILY
Qty: 90 TABLET | Refills: 1 | Status: SHIPPED | OUTPATIENT
Start: 2025-04-30

## 2025-04-30 NOTE — PROGRESS NOTES
Office Visit      Date:  2025   Patient Name: Rajani Valentine  : 1965   MRN: [id number]     Chief Complaint   Patient presents with    Review labs       History of Present Illness: Rajani Valentine is a 59 y.o. female who is here today for routine follow-up visit to review lab results.  She is still having issues with right wrist pain and currently is wearing wrist brace.  We discussed conservative treatment at her last appointment and she reports this has had minimal effect on her wrist pain.  She is also continuing to have labile hypertension.  She reports compliance with medications, no missed doses, no side effects.  Otherwise, she denies any new illnesses, injuries, or hospitalizations since last visit. Denies falls, unexplained weight change, fevers, chills, or other constitutional symptoms and has no additional questions or concerns at this time.        Subjective      Past Medical History:   Diagnosis Date    Abnormal Pap smear of cervix     Anxiety     Bell's palsy 2022    Depression     Endometriosis     Esophageal reflux     HPV (human papilloma virus) infection     Hyperlipidemia     Hypertension     Infectious mononucleosis without complication 2025    Irritable bowel syndrome     Multiple gestation     Ovarian cyst     Rheumatic fever 2025    Sprain of metacarpophalangeal joint of left index finger 2025    Subcutaneous mass 2025    Tonsillitis 2025    Ulcerative colitis     Varicella         Past Surgical History:   Procedure Laterality Date    ANKLE ARTHROPLASTY Left 2009    ANKLE SURGERY      CHOLECYSTECTOMY      CHOLECYSTECTOMY Bilateral     COLPOSCOPY W/ BIOPSY / CURETTAGE      [facility]    ENDOMETRIAL ABLATION      LAPAROSCOPIC CHOLECYSTECTOMY      TUBAL ABDOMINAL LIGATION         Family History   Problem Relation Age of Onset    Skin cancer Father     Heart disease Father     Hypertension Mother     Alzheimer's  disease Mother     Allergies Mother     Depression Mother     Hypertension Brother     Allergies Sister     No Known Problems Paternal Grandfather     Heart disease Paternal Grandmother     Colon cancer Maternal Grandmother     Dementia Maternal Grandfather     No Known Problems Maternal Aunt     No Known Problems Maternal Uncle     No Known Problems Paternal Aunt     Skin cancer Paternal Uncle     Alzheimer's disease Other     Breast cancer Neg Hx     Ovarian cancer Neg Hx     Uterine cancer Neg Hx        Social History     Socioeconomic History    Marital status: Significant Other    Number of children: 3   Tobacco Use    Smoking status: Former     Current packs/day: 0.00     Average packs/day: 0.5 packs/day for 37.0 years (18.5 ttl pk-yrs)     Types: Cigarettes     Start date: 1/1/1985     Quit date: 2022     Years since quitting: 3.3     Passive exposure: Past    Smokeless tobacco: Never    Tobacco comments:     quit 2 years ago   Vaping Use    Vaping status: Never Used   Substance and Sexual Activity    Alcohol use: Not Currently     Comment: socially    Drug use: Yes     Types: Marijuana    Sexual activity: Not Currently     Partners: Male     Birth control/protection: Post-menopausal, Tubal ligation, Surgical     Comment: BTL        Current Outpatient Medications   Medication Instructions    amLODIPine (NORVASC) 10 mg, Oral, Daily    busPIRone (BUSPAR) 7.5 mg, Oral, 2 Times Daily    chlorthalidone (HYGROTEN) 25 mg, Oral, Daily    cloNIDine (CATAPRES) 0.1 mg, Oral, 2 Times Daily PRN    clotrimazole (LOTRIMIN) 1 % cream 1 Application, Topical, 2 Times Daily    hydrALAZINE (APRESOLINE) 25 mg, Oral, 3 Times Daily    hydrOXYzine (ATARAX) 25 mg, Oral, Every 8 Hours PRN, for anxiety    losartan (COZAAR) 100 mg, Oral, Daily    meloxicam (MOBIC) 15 mg, Oral, Daily    metoprolol succinate XL (TOPROL-XL) 200 mg, Oral, Daily    mupirocin (BACTROBAN) 2 % ointment 1 Application, Topical, 3 Times Daily    omeprazole  "(PRILOSEC) 20 mg, Oral, Daily    pravastatin (PRAVACHOL) 10 mg, Oral, Daily    sertraline (ZOLOFT) 150 mg, Oral, Daily    traZODone (DESYREL) 50 mg, Oral, Every Night at Bedtime       Allergies   Allergen Reactions    Gatifloxacin Unknown - High Severity    Crestor [Rosuvastatin] Myalgia    Lipitor [Atorvastatin] Myalgia    Diethylpropion Hcl Unknown - Low Severity    Valacyclovir Unknown - Low Severity       Objective     Physical Exam  Vitals and nursing note reviewed.   Constitutional:       General: She is not in acute distress.  HENT:      Head: Normocephalic.   Eyes:      Pupils: Pupils are equal, round, and reactive to light.   Cardiovascular:      Rate and Rhythm: Normal rate and regular rhythm.   Pulmonary:      Effort: Pulmonary effort is normal.      Breath sounds: Normal breath sounds.   Musculoskeletal:      Right wrist: Tenderness (Overlying base of right thumb-possibly presence of small ganglion cyst) present. No swelling, effusion, lacerations or snuff box tenderness. Decreased range of motion.      Left wrist: Normal.        Hands:    Skin:     General: Skin is warm and dry.   Neurological:      Mental Status: She is alert and oriented to person, place, and time.      Cranial Nerves: Facial asymmetry: Baseline secondary to Bell's palsy.   Psychiatric:         Mood and Affect: Mood normal.       Vitals:    04/30/25 1019 04/30/25 1024   BP: 142/100 134/100   BP Location: Left arm Left arm   Patient Position: Sitting Sitting   Cuff Size: Adult Adult   Pulse: 70    Resp: 16    Temp: 98.5 °F (36.9 °C)    TempSrc: Oral    SpO2: 96%    Weight: 81.1 kg (178 lb 12.8 oz)    Height: 157.5 cm (62\")    PainSc: 0-No pain      Body mass index is 32.7 kg/m².     No results found for this or any previous visit.     The 10-year ASCVD risk score (Sadia DK, et al., 2019) is: 5.7%    Values used to calculate the score:      Age: 59 years      Sex: Female      Is Non- : No      Diabetic: No      " Tobacco smoker: No      Systolic Blood Pressure: 134 mmHg      Is BP treated: Yes      HDL Cholesterol: 39 mg/dL      Total Cholesterol: 203 mg/dL         Assessment / Plan         Prediabetes  Most recent hemoglobin A1c 5.7%  Discussed with patient: result indicates average blood sugars are in pre-diabetes range  Recommend ADA diet and exercise recommendations  Patient education sheet provided today with information about  diabetes prevention program    Plan:  Diet and lifestyle changes to prevent progression to DM 2  Re-check HgbA1c in 6 months unless otherwise indicated       Hyperlipidemia LDL goal <100   Lipid abnormalities are worsening    Plan:  Begin taking the following medication/s; prevastatin.      Discussed medication dosage, use, side effects, and goals of treatment in detail.    Counseled patient on lifestyle modifications to help control hyperlipidemia.   Referral to cardiologist    Patient Treatment Goals:   LDL goal is less than 70    Followup in 6 months.    Orders:    pravastatin (PRAVACHOL) 10 MG tablet; Take 1 tablet by mouth Daily.    Primary hypertension  Hypertension is uncontrolled and labile   Hx of renal doppler in 2023 - negative for renal artery stenosis  Pt not missing medication doses  Referral to cardiology - pt will call and schedule with Nichelle Bustos with Cardiology to see if there are insights r/t medications  Blood pressure will be reassessedin 4 weeks.  Clonidine perameter: BP > 160/100  Repeat bp during appt - recommended clonidine dose       Pain of right hand  Ongoing complaint of chronic right wrist pain  Symptoms consistent with possible carpal tunnel vs ganglion cyst  Failed conservative treatment with meloxicam and intermittent wrist brace  Discussed referral to Ortho, patient agrees  She will continue conservative treatment until evaluated by Ortho    Orders:    Ambulatory Referral to Orthopedic Surgery    Encounter for screening mammogram for malignant neoplasm of  breast           Patient Education:   Reviewed medications, potential side effects and signs and symptoms to report.   Discussed risk vs benefits of treatment plan with patient including medications, labs, and imaging.    Patient education materials attached to AVS and reviewed with patient during office visit today.   Addressed questions and concerns during visit. Patient verbalized understanding and agrees with tx plan.   Instructed to call the office with any questions and report to ER with any life-threatening symptoms.    Return in about 6 months (around 10/30/2025) for schedule with cardiology to address HTN issue.    SKY Duff (Libby)  [facility]   [address] [id number] [phone]    NOTE TO PATIENT: The 21st Century Cures Act makes medical notes like these available to patients in the interest of transparency. However, be advised this is a medical document. It is intended as peer to peer communication. It is written in medical language and may contain abbreviations or verbiage that are unfamiliar. It may appear blunt or direct. Medical documents are intended to carry relevant information, facts as evident, and the clinical opinion of the practitioner.      EMR Dragon/Transcription disclaimer: Much of this encounter note is an electronic transcription of spoken language to printed text. Electronic transcription of spoken language may permit erroneous, or at times, nonsensical words or phrases to be inadvertently transcribed. Although I have reviewed the note for such errors, some may still exist.

## 2025-04-30 NOTE — ASSESSMENT & PLAN NOTE
Hypertension is uncontrolled and labile   Hx of renal doppler in 2023 - negative for renal artery stenosis  Pt not missing medication doses  Referral to cardiology - pt will call and schedule with Nichelle Bustos with Cardiology to see if there are insights r/t medications  Blood pressure will be reassessedin 4 weeks.  Clonidine perameter: BP > 160/100  Repeat bp during appt - recommended clonidine dose

## 2025-04-30 NOTE — ASSESSMENT & PLAN NOTE
Ongoing complaint of chronic right wrist pain  Symptoms consistent with possible carpal tunnel vs ganglion cyst  Failed conservative treatment with meloxicam and intermittent wrist brace  Discussed referral to Ortho, patient agrees  She will continue conservative treatment until evaluated by Ortho    Orders:    Ambulatory Referral to Orthopedic Surgery

## 2025-04-30 NOTE — PATIENT INSTRUCTIONS
Health Maintenance, Female  Adopting a healthy lifestyle and getting preventive care can go a long way to promote health and wellness. Talk with your health care provider about what schedule of regular examinations is right for you. This is a good chance for you to check in with your provider about disease prevention and staying healthy.  In between checkups, there are plenty of things you can do on your own. Experts have done a lot of research about which lifestyle changes and preventive measures are most likely to keep you healthy. Ask your health care provider for more information.  Weight and diet  Eat a healthy diet  Be sure to include plenty of vegetables, fruits, low-fat dairy products, and lean protein.  Do not eat a lot of foods high in solid fats, added sugars, or salt.  Get regular exercise. This is one of the most important things you can do for your health.  Most adults should exercise for at least 150 minutes each week. The exercise should increase your heart rate and make you sweat (moderate-intensity exercise).  Most adults should also do strengthening exercises at least twice a week. This is in addition to the moderate-intensity exercise.     Maintain a healthy weight  Body mass index (BMI) is a measurement that can be used to identify possible weight problems. It estimates body fat based on height and weight. Your health care provider can help determine your BMI and help you achieve or maintain a healthy weight.  For females 20 years of age and older:  A BMI below 18.5 is considered underweight.  A BMI of 18.5 to 24.9 is normal.  A BMI of 25 to 29.9 is considered overweight.  A BMI of 30 and above is considered obese.     Watch levels of cholesterol and blood lipids  You should start having your blood tested for lipids and cholesterol at 20 years of age, then have this test every 5 years.  You may need to have your cholesterol levels checked more often if:  Your lipid or cholesterol levels are  high.  You are older than 50 years of age.  You are at high risk for heart disease.     Cancer screening  Lung Cancer  Lung cancer screening is recommended for adults 55-80 years old who are at high risk for lung cancer because of a history of smoking.  A yearly low-dose CT scan of the lungs is recommended for people who:  Currently smoke.  Have quit within the past 15 years.  Have at least a 30-pack-year history of smoking. A pack year is smoking an average of one pack of cigarettes a day for 1 year.  Yearly screening should continue until it has been 15 years since you quit.  Yearly screening should stop if you develop a health problem that would prevent you from having lung cancer treatment.     Breast Cancer  Practice breast self-awareness. This means understanding how your breasts normally appear and feel.  It also means doing regular breast self-exams. Let your health care provider know about any changes, no matter how small.  If you are in your 20s or 30s, you should have a clinical breast exam (CBE) by a health care provider every 1-3 years as part of a regular health exam.  If you are 40 or older, have a CBE every year. Also consider having a breast X-ray (mammogram) every year.  If you have a family history of breast cancer, talk to your health care provider about genetic screening.  If you are at high risk for breast cancer, talk to your health care provider about having an MRI and a mammogram every year.  Breast cancer gene (BRCA) assessment is recommended for women who have family members with BRCA-related cancers. BRCA-related cancers include:  Breast.  Ovarian.  Tubal.  Peritoneal cancers.  Results of the assessment will determine the need for genetic counseling and BRCA1 and BRCA2 testing.     Cervical Cancer  Your health care provider may recommend that you be screened regularly for cancer of the pelvic organs (ovaries, uterus, and vagina). This screening involves a pelvic examination, including  checking for microscopic changes to the surface of your cervix (Pap test). You may be encouraged to have this screening done every 3 years, beginning at age 21.  For women ages 30-65, health care providers may recommend pelvic exams and Pap testing every 3 years, or they may recommend the Pap and pelvic exam, combined with testing for human papilloma virus (HPV), every 5 years. Some types of HPV increase your risk of cervical cancer. Testing for HPV may also be done on women of any age with unclear Pap test results.  Other health care providers may not recommend any screening for nonpregnant women who are considered low risk for pelvic cancer and who do not have symptoms. Ask your health care provider if a screening pelvic exam is right for you.  If you have had past treatment for cervical cancer or a condition that could lead to cancer, you need Pap tests and screening for cancer for at least 20 years after your treatment. If Pap tests have been discontinued, your risk factors (such as having a new sexual partner) need to be reassessed to determine if screening should resume. Some women have medical problems that increase the chance of getting cervical cancer. In these cases, your health care provider may recommend more frequent screening and Pap tests.     Colorectal Cancer  This type of cancer can be detected and often prevented.  Routine colorectal cancer screening usually begins at 50 years of age and continues through 75 years of age.  Your health care provider may recommend screening at an earlier age if you have risk factors for colon cancer.  Your health care provider may also recommend using home test kits to check for hidden blood in the stool.  A small camera at the end of a tube can be used to examine your colon directly (sigmoidoscopy or colonoscopy). This is done to check for the earliest forms of colorectal cancer.  Routine screening usually begins at age 50.  Direct examination of the colon should  be repeated every 5-10 years through 75 years of age. However, you may need to be screened more often if early forms of precancerous polyps or small growths are found.     Skin Cancer  Check your skin from head to toe regularly.  Tell your health care provider about any new moles or changes in moles, especially if there is a change in a mole's shape or color.  Also tell your health care provider if you have a mole that is larger than the size of a pencil eraser.  Always use sunscreen. Apply sunscreen liberally and repeatedly throughout the day.  Protect yourself by wearing long sleeves, pants, a wide-brimmed hat, and sunglasses whenever you are outside.     Heart disease, diabetes, and high blood pressure  High blood pressure causes heart disease and increases the risk of stroke. High blood pressure is more likely to develop in:  People who have blood pressure in the high end of the normal range (130-139/85-89 mm Hg).  People who are overweight or obese.  People who are .  If you are 18-39 years of age, have your blood pressure checked every 3-5 years. If you are 40 years of age or older, have your blood pressure checked every year. You should have your blood pressure measured twice--once when you are at a hospital or clinic, and once when you are not at a hospital or clinic. Record the average of the two measurements. To check your blood pressure when you are not at a hospital or clinic, you can use:  An automated blood pressure machine at a pharmacy.  A home blood pressure monitor.  If you are between 55 years and 79 years old, ask your health care provider if you should take aspirin to prevent strokes.  Have regular diabetes screenings. This involves taking a blood sample to check your fasting blood sugar level.  If you are at a normal weight and have a low risk for diabetes, have this test once every three years after 45 years of age.  If you are overweight and have a high risk for diabetes,  consider being tested at a younger age or more often.  Preventing infection  Hepatitis B  If you have a higher risk for hepatitis B, you should be screened for this virus. You are considered at high risk for hepatitis B if:  You were born in a country where hepatitis B is common. Ask your health care provider which countries are considered high risk.  Your parents were born in a high-risk country, and you have not been immunized against hepatitis B (hepatitis B vaccine).  You have HIV or AIDS.  You use needles to inject street drugs.  You live with someone who has hepatitis B.  You have had sex with someone who has hepatitis B.  You get hemodialysis treatment.  You take certain medicines for conditions, including cancer, organ transplantation, and autoimmune conditions.     Hepatitis C  Blood testing is recommended for:  Everyone born from 1945 through 1965.  Anyone with known risk factors for hepatitis C.     Sexually transmitted infections (STIs)  You should be screened for sexually transmitted infections (STIs) including gonorrhea and chlamydia if:  You are sexually active and are younger than 24 years of age.  You are older than 24 years of age and your health care provider tells you that you are at risk for this type of infection.  Your sexual activity has changed since you were last screened and you are at an increased risk for chlamydia or gonorrhea. Ask your health care provider if you are at risk.  If you do not have HIV, but are at risk, it may be recommended that you take a prescription medicine daily to prevent HIV infection. This is called pre-exposure prophylaxis (PrEP). You are considered at risk if:  You are sexually active and do not regularly use condoms or know the HIV status of your partner(s).  You take drugs by injection.  You are sexually active with a partner who has HIV.     Talk with your health care provider about whether you are at high risk of being infected with HIV. If you choose to  begin PrEP, you should first be tested for HIV. You should then be tested every 3 months for as long as you are taking PrEP.  Pregnancy  If you are premenopausal and you may become pregnant, ask your health care provider about preconception counseling.  If you may become pregnant, take 400 to 800 micrograms (mcg) of folic acid every day.  If you want to prevent pregnancy, talk to your health care provider about birth control (contraception).  Osteoporosis and menopause  Osteoporosis is a disease in which the bones lose minerals and strength with aging. This can result in serious bone fractures. Your risk for osteoporosis can be identified using a bone density scan.  If you are 65 years of age or older, or if you are at risk for osteoporosis and fractures, ask your health care provider if you should be screened.  Ask your health care provider whether you should take a calcium or vitamin D supplement to lower your risk for osteoporosis.  Menopause may have certain physical symptoms and risks.  Hormone replacement therapy may reduce some of these symptoms and risks.  Talk to your health care provider about whether hormone replacement therapy is right for you.  Follow these instructions at home:  Schedule regular health, dental, and eye exams.  Stay current with your immunizations.  Do not use any tobacco products including cigarettes, chewing tobacco, or electronic cigarettes.  If you are pregnant, do not drink alcohol.  If you are breastfeeding, limit how much and how often you drink alcohol.  Limit alcohol intake to no more than 1 drink per day for nonpregnant women. One drink equals 12 ounces of beer, 5 ounces of wine, or 1½ ounces of hard liquor.  Do not use street drugs.  Do not share needles.  Ask your health care provider for help if you need support or information about quitting drugs.  Tell your health care provider if you often feel depressed.  Tell your health care provider if you have ever been abused or do  not feel safe at home.  This information is not intended to replace advice given to you by your health care provider. Make sure you discuss any questions you have with your health care provider.  Document Released: 07/02/2012 Document Revised: 05/25/2017 Document Reviewed: 09/20/2016  [employer] Interactive Patient Education © 2018 [employer]

## 2025-04-30 NOTE — ASSESSMENT & PLAN NOTE
Most recent hemoglobin A1c 5.7%  Discussed with patient: result indicates average blood sugars are in pre-diabetes range  Recommend ADA diet and exercise recommendations  Patient education sheet provided today with information about  diabetes prevention program    Plan:  Diet and lifestyle changes to prevent progression to DM 2  Re-check HgbA1c in 6 months unless otherwise indicated

## 2025-04-30 NOTE — ASSESSMENT & PLAN NOTE
Lipid abnormalities are worsening    Plan:  Begin taking the following medication/s; prevastatin.      Discussed medication dosage, use, side effects, and goals of treatment in detail.    Counseled patient on lifestyle modifications to help control hyperlipidemia.   Referral to cardiologist    Patient Treatment Goals:   LDL goal is less than 70    Followup in 6 months.    Orders:    pravastatin (PRAVACHOL) 10 MG tablet; Take 1 tablet by mouth Daily.

## 2025-05-05 ENCOUNTER — OFFICE VISIT (OUTPATIENT)
Age: 60
End: 2025-05-05
Payer: MEDICAID

## 2025-05-05 VITALS
DIASTOLIC BLOOD PRESSURE: 100 MMHG | BODY MASS INDEX: 33.58 KG/M2 | HEIGHT: 62 IN | WEIGHT: 182.5 LBS | SYSTOLIC BLOOD PRESSURE: 140 MMHG

## 2025-05-05 DIAGNOSIS — M25.531 RIGHT WRIST PAIN: ICD-10-CM

## 2025-05-05 DIAGNOSIS — M19.031 ARTHRITIS OF SCAPHOID-TRAPEZIUM-TRAPEZOID JOINT OF RIGHT HAND: Primary | ICD-10-CM

## 2025-05-05 PROCEDURE — 3080F DIAST BP >= 90 MM HG: CPT | Performed by: PLASTIC SURGERY

## 2025-05-05 PROCEDURE — 1159F MED LIST DOCD IN RCRD: CPT | Performed by: PLASTIC SURGERY

## 2025-05-05 PROCEDURE — 3077F SYST BP >= 140 MM HG: CPT | Performed by: PLASTIC SURGERY

## 2025-05-05 PROCEDURE — 20605 DRAIN/INJ JOINT/BURSA W/O US: CPT | Performed by: PLASTIC SURGERY

## 2025-05-05 PROCEDURE — 99203 OFFICE O/P NEW LOW 30 MIN: CPT | Performed by: PLASTIC SURGERY

## 2025-05-05 PROCEDURE — 1160F RVW MEDS BY RX/DR IN RCRD: CPT | Performed by: PLASTIC SURGERY

## 2025-05-05 RX ORDER — TRIAMCINOLONE ACETONIDE 40 MG/ML
40 INJECTION, SUSPENSION INTRA-ARTICULAR; INTRAMUSCULAR
Status: COMPLETED | OUTPATIENT
Start: 2025-05-05 | End: 2025-05-05

## 2025-05-05 RX ORDER — LIDOCAINE HYDROCHLORIDE 10 MG/ML
0.5 INJECTION, SOLUTION EPIDURAL; INFILTRATION; INTRACAUDAL; PERINEURAL
Status: COMPLETED | OUTPATIENT
Start: 2025-05-05 | End: 2025-05-05

## 2025-05-05 RX ADMIN — TRIAMCINOLONE ACETONIDE 40 MG: 40 INJECTION, SUSPENSION INTRA-ARTICULAR; INTRAMUSCULAR at 14:38

## 2025-05-05 RX ADMIN — LIDOCAINE HYDROCHLORIDE 0.5 ML: 10 INJECTION, SOLUTION EPIDURAL; INFILTRATION; INTRACAUDAL; PERINEURAL at 14:38

## 2025-05-05 NOTE — PROGRESS NOTES
Procedure   - Hand/Upper Extremity Injection for (Right STT) on 5/5/2025 2:38 PM  Indications: pain  Details: 25 G needle, radial approach  Medications: 0.5 mL lidocaine PF 1% 1 %; 40 mg triamcinolone acetonide 40 MG/ML  Outcome: tolerated well, no immediate complications  Procedure, treatment alternatives, risks and benefits explained, specific risks discussed. Consent was given by the patient. Immediately prior to procedure a time out was called to verify the correct patient, procedure, equipment, support staff and site/side marked as required. Patient was prepped and draped in the usual sterile fashion.

## 2025-05-05 NOTE — PROGRESS NOTES
"                                                               [facility]     Office Visit       Date: 05/05/2025   Patient Name: Rajani Valentine  MRN: [id number]  YOB: 1965    Referring Physician: Nisha Vegas Ra*     Chief Complaint:   Chief Complaint   Patient presents with    Right Wrist - Pain       History of Present Illness:   Rajani Valentine is a 59 y.o. female right-hand-dominant presents with right basilar thumb pain of 2 weeks duration.  Patient reports that she spent several days baling water after the [place] flights 2 weeks ago.  Reports she has had subsequent right basilar thumb pain since then.  Has tried bracing and anti-inflammatories with minimal improvement in her symptoms.  She works in home health care.  She denies smoking.  She is otherwise healthy.      Subjective   Review of Systems:   Review of Systems   Musculoskeletal:  Positive for arthralgias.   All other systems reviewed and are negative.       Pertinent review of systems per HPI.     I reviewed the patient's chief complaint, history of present illness, review of systems, past medical history, surgical history, family history, social history, medications and allergy list in the EMR on 05/05/2025 and agree with the findings above.    Objective    Vital Signs:   Vitals:    05/05/25 1418   BP: 140/100   Weight: 82.8 kg (182 lb 8 oz)   Height: 157.5 cm (62.01\")     BMI: Body mass index is 33.37 kg/m².    General Appearance: No acute distress. Alert and oriented.     Chest:  Non-labored breathing on room air. Regular rate and rhythm.    Upper Extremity Exam:    Tender palpation of the right thumb STT joint and to a lesser degree the CMC joint.  Positive shuck test.  Negative grind test.  Negative Finkelstein's test.  Nontender over the remainder of the right hand and wrist.    Fingers are warm, well-perfused with appropriate capillary refill.  Palpable radial pulse.    Sensation intact to light " touch in median, radial and ulnar nerve distributions.    Motor- Fires FPL, ulnar intrinsics, EPL/EDC w/ full active and passive range of motion. Strength intact.    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  Right lunotriquetral synostosis                Procedures:  Procedures    Quality Measures:   ACP:   ACP discussion was deferred.    Tobacco:   Rajani Valentine  reports that she quit smoking about 3 years ago. Her smoking use included cigarettes. She started smoking about 40 years ago. She has a 18.5 pack-year smoking history. She has been exposed to tobacco smoke. She has never used smokeless tobacco.      Assessment / Plan    Assessment/Plan:     There are no diagnoses linked to this encounter.     Rajani Caballeros a 59 y.o. female who presents with:      ICD-10-CM ICD-9-CM   1. Right wrist pain  M25.531 719.43   2. Arthritis of ejzxdmth-wirdrofdg-oaxwcvyhb joint of right hand  M19.031 716.93         Patient presents with right STT arthritis.  We discussed her diagnosis as well as treatment options going bracing anti-inflammatories corticosteroid injection and surgery.  Recommend a trial of nonoperative treatment with right upper extremity Comfort Cool brace, right STT corticosteroid ejections today and follow-up as needed.    Procedure Note:    I discussed with the patient the potential benefits of performing therapeutic aspiration and  injections as well as potential risks including but not limited to infection, swelling, pain, bleeding, bruising, nerve/vessel damage, skin color changes, transient elevation in blood glucose levels, and fat atrophy. After informed consent and after the areas were prepped with chlorhexadine soap, ethyl chloride was used to numb the skin. Via the dorsal approach, 0.5 mL of 1% lidocaine was injected followed by injection of 20mg of Kenalog into the right thumb STT joint.  The patient tolerated the procedure well. There were no complications. A sterile dressing was placed over the injection sites.      Follow Up:   Return if symptoms worsen or fail to improve.        Carlos Enrique Garrison MD  [facility]

## 2025-07-16 RX ORDER — OMEPRAZOLE 20 MG/1
20 CAPSULE, DELAYED RELEASE ORAL DAILY
Qty: 90 CAPSULE | Refills: 0 | Status: SHIPPED | OUTPATIENT
Start: 2025-07-16